# Patient Record
Sex: MALE | Race: BLACK OR AFRICAN AMERICAN | NOT HISPANIC OR LATINO | Employment: OTHER
[De-identification: names, ages, dates, MRNs, and addresses within clinical notes are randomized per-mention and may not be internally consistent; named-entity substitution may affect disease eponyms.]

---

## 2017-10-05 ENCOUNTER — RECORDS - HEALTHEAST (OUTPATIENT)
Dept: ADMINISTRATIVE | Facility: OTHER | Age: 77
End: 2017-10-05

## 2018-05-22 ENCOUNTER — HOME CARE/HOSPICE - HEALTHEAST (OUTPATIENT)
Dept: HOME HEALTH SERVICES | Facility: HOME HEALTH | Age: 78
End: 2018-05-22

## 2018-05-27 ENCOUNTER — COMMUNICATION - HEALTHEAST (OUTPATIENT)
Dept: SCHEDULING | Facility: CLINIC | Age: 78
End: 2018-05-27

## 2018-05-27 DIAGNOSIS — E11.29 TYPE 2 DIABETES MELLITUS WITH OTHER KIDNEY COMPLICATION, UNSPECIFIED LONG TERM INSULIN USE STATUS: ICD-10-CM

## 2018-05-27 RX ORDER — GLUCOSAMINE HCL/CHONDROITIN SU 500-400 MG
1 CAPSULE ORAL PRN
Qty: 10 STRIP | Refills: 0 | Status: SHIPPED | OUTPATIENT
Start: 2018-05-27

## 2018-10-25 ENCOUNTER — RECORDS - HEALTHEAST (OUTPATIENT)
Dept: ADMINISTRATIVE | Facility: OTHER | Age: 78
End: 2018-10-25

## 2021-05-27 ENCOUNTER — RECORDS - HEALTHEAST (OUTPATIENT)
Dept: ADMINISTRATIVE | Facility: CLINIC | Age: 81
End: 2021-05-27

## 2021-05-29 ENCOUNTER — RECORDS - HEALTHEAST (OUTPATIENT)
Dept: ADMINISTRATIVE | Facility: CLINIC | Age: 81
End: 2021-05-29

## 2021-05-30 ENCOUNTER — RECORDS - HEALTHEAST (OUTPATIENT)
Dept: ADMINISTRATIVE | Facility: CLINIC | Age: 81
End: 2021-05-30

## 2021-06-01 ENCOUNTER — RECORDS - HEALTHEAST (OUTPATIENT)
Dept: ADMINISTRATIVE | Facility: CLINIC | Age: 81
End: 2021-06-01

## 2021-06-02 ENCOUNTER — RECORDS - HEALTHEAST (OUTPATIENT)
Dept: ADMINISTRATIVE | Facility: CLINIC | Age: 81
End: 2021-06-02

## 2021-06-16 PROBLEM — R73.9 ACUTE HYPERGLYCEMIA: Status: ACTIVE | Noted: 2018-05-21

## 2021-10-22 DIAGNOSIS — Z11.59 ENCOUNTER FOR SCREENING FOR OTHER VIRAL DISEASES: ICD-10-CM

## 2021-11-12 ENCOUNTER — ANESTHESIA EVENT (OUTPATIENT)
Dept: SURGERY | Facility: AMBULATORY SURGERY CENTER | Age: 81
End: 2021-11-12

## 2021-11-12 RX ORDER — GLIPIZIDE 5 MG/1
5 TABLET ORAL
COMMUNITY
Start: 2021-08-03

## 2021-11-12 ASSESSMENT — MIFFLIN-ST. JEOR: SCORE: 1439.74

## 2021-11-15 ENCOUNTER — ANESTHESIA (OUTPATIENT)
Dept: SURGERY | Facility: AMBULATORY SURGERY CENTER | Age: 81
End: 2021-11-15

## 2021-11-15 ENCOUNTER — HOSPITAL ENCOUNTER (OUTPATIENT)
Facility: AMBULATORY SURGERY CENTER | Age: 81
End: 2021-11-15
Attending: UROLOGY
Payer: COMMERCIAL

## 2021-11-15 VITALS
BODY MASS INDEX: 26.68 KG/M2 | RESPIRATION RATE: 16 BRPM | OXYGEN SATURATION: 100 % | SYSTOLIC BLOOD PRESSURE: 149 MMHG | TEMPERATURE: 97.1 F | WEIGHT: 170 LBS | DIASTOLIC BLOOD PRESSURE: 76 MMHG | HEART RATE: 65 BPM | HEIGHT: 67 IN

## 2021-11-15 DIAGNOSIS — R97.20 ELEVATED PSA: ICD-10-CM

## 2021-11-15 LAB
GLUCOSE BLDC GLUCOMTR-MCNC: 108 MG/DL (ref 70–99)
GLUCOSE POCT: 116 MG/DL (ref 70–99)

## 2021-11-15 RX ORDER — FENTANYL CITRATE 50 UG/ML
25 INJECTION, SOLUTION INTRAMUSCULAR; INTRAVENOUS
Status: DISCONTINUED | OUTPATIENT
Start: 2021-11-15 | End: 2021-11-16 | Stop reason: HOSPADM

## 2021-11-15 RX ORDER — ONDANSETRON 2 MG/ML
INJECTION INTRAMUSCULAR; INTRAVENOUS PRN
Status: DISCONTINUED | OUTPATIENT
Start: 2021-11-15 | End: 2021-11-15

## 2021-11-15 RX ORDER — SODIUM CHLORIDE, SODIUM LACTATE, POTASSIUM CHLORIDE, CALCIUM CHLORIDE 600; 310; 30; 20 MG/100ML; MG/100ML; MG/100ML; MG/100ML
INJECTION, SOLUTION INTRAVENOUS CONTINUOUS
Status: DISCONTINUED | OUTPATIENT
Start: 2021-11-15 | End: 2021-11-16 | Stop reason: HOSPADM

## 2021-11-15 RX ORDER — LEVOFLOXACIN 5 MG/ML
500 INJECTION, SOLUTION INTRAVENOUS
Status: DISCONTINUED | OUTPATIENT
Start: 2021-11-15 | End: 2021-11-15

## 2021-11-15 RX ORDER — OXYCODONE HYDROCHLORIDE 5 MG/1
5 TABLET ORAL EVERY 4 HOURS PRN
Status: DISCONTINUED | OUTPATIENT
Start: 2021-11-15 | End: 2021-11-16 | Stop reason: HOSPADM

## 2021-11-15 RX ORDER — LIDOCAINE HYDROCHLORIDE 10 MG/ML
INJECTION, SOLUTION EPIDURAL; INFILTRATION; INTRACAUDAL; PERINEURAL PRN
Status: DISCONTINUED | OUTPATIENT
Start: 2021-11-15 | End: 2021-11-15 | Stop reason: HOSPADM

## 2021-11-15 RX ORDER — PROPOFOL 10 MG/ML
INJECTION, EMULSION INTRAVENOUS CONTINUOUS PRN
Status: DISCONTINUED | OUTPATIENT
Start: 2021-11-15 | End: 2021-11-15

## 2021-11-15 RX ORDER — DEXAMETHASONE SODIUM PHOSPHATE 4 MG/ML
INJECTION, SOLUTION INTRA-ARTICULAR; INTRALESIONAL; INTRAMUSCULAR; INTRAVENOUS; SOFT TISSUE PRN
Status: DISCONTINUED | OUTPATIENT
Start: 2021-11-15 | End: 2021-11-15

## 2021-11-15 RX ORDER — LIDOCAINE 40 MG/G
CREAM TOPICAL
Status: DISCONTINUED | OUTPATIENT
Start: 2021-11-15 | End: 2021-11-16 | Stop reason: HOSPADM

## 2021-11-15 RX ORDER — PROPOFOL 10 MG/ML
INJECTION, EMULSION INTRAVENOUS PRN
Status: DISCONTINUED | OUTPATIENT
Start: 2021-11-15 | End: 2021-11-15

## 2021-11-15 RX ORDER — ONDANSETRON 4 MG/1
4 TABLET, ORALLY DISINTEGRATING ORAL EVERY 30 MIN PRN
Status: DISCONTINUED | OUTPATIENT
Start: 2021-11-15 | End: 2021-11-16 | Stop reason: HOSPADM

## 2021-11-15 RX ORDER — ONDANSETRON 2 MG/ML
4 INJECTION INTRAMUSCULAR; INTRAVENOUS EVERY 30 MIN PRN
Status: DISCONTINUED | OUTPATIENT
Start: 2021-11-15 | End: 2021-11-16 | Stop reason: HOSPADM

## 2021-11-15 RX ORDER — MEPERIDINE HYDROCHLORIDE 25 MG/ML
12.5 INJECTION INTRAMUSCULAR; INTRAVENOUS; SUBCUTANEOUS
Status: DISCONTINUED | OUTPATIENT
Start: 2021-11-15 | End: 2021-11-16 | Stop reason: HOSPADM

## 2021-11-15 RX ORDER — LIDOCAINE HYDROCHLORIDE 20 MG/ML
INJECTION, SOLUTION INFILTRATION; PERINEURAL PRN
Status: DISCONTINUED | OUTPATIENT
Start: 2021-11-15 | End: 2021-11-15

## 2021-11-15 RX ORDER — FENTANYL CITRATE 50 UG/ML
25 INJECTION, SOLUTION INTRAMUSCULAR; INTRAVENOUS EVERY 5 MIN PRN
Status: DISCONTINUED | OUTPATIENT
Start: 2021-11-15 | End: 2021-11-16 | Stop reason: HOSPADM

## 2021-11-15 RX ORDER — HYDROMORPHONE HCL IN WATER/PF 6 MG/30 ML
0.2 PATIENT CONTROLLED ANALGESIA SYRINGE INTRAVENOUS EVERY 5 MIN PRN
Status: DISCONTINUED | OUTPATIENT
Start: 2021-11-15 | End: 2021-11-16 | Stop reason: HOSPADM

## 2021-11-15 RX ORDER — CIPROFLOXACIN 500 MG/1
500 TABLET, FILM COATED ORAL ONCE
Status: COMPLETED | OUTPATIENT
Start: 2021-11-15 | End: 2021-11-15

## 2021-11-15 RX ORDER — ACETAMINOPHEN 325 MG/1
650 TABLET ORAL
Status: DISCONTINUED | OUTPATIENT
Start: 2021-11-15 | End: 2021-11-16 | Stop reason: HOSPADM

## 2021-11-15 RX ADMIN — LIDOCAINE HYDROCHLORIDE 40 MG: 20 INJECTION, SOLUTION INFILTRATION; PERINEURAL at 09:56

## 2021-11-15 RX ADMIN — PROPOFOL 20 MG: 10 INJECTION, EMULSION INTRAVENOUS at 09:58

## 2021-11-15 RX ADMIN — SODIUM CHLORIDE, SODIUM LACTATE, POTASSIUM CHLORIDE, CALCIUM CHLORIDE: 600; 310; 30; 20 INJECTION, SOLUTION INTRAVENOUS at 09:21

## 2021-11-15 RX ADMIN — ACETAMINOPHEN 650 MG: 325 TABLET ORAL at 10:53

## 2021-11-15 RX ADMIN — CIPROFLOXACIN 500 MG: 500 TABLET, FILM COATED ORAL at 09:16

## 2021-11-15 RX ADMIN — ONDANSETRON 4 MG: 2 INJECTION INTRAMUSCULAR; INTRAVENOUS at 10:05

## 2021-11-15 RX ADMIN — OXYCODONE HYDROCHLORIDE 5 MG: 5 TABLET ORAL at 10:54

## 2021-11-15 RX ADMIN — PROPOFOL 200 MCG/KG/MIN: 10 INJECTION, EMULSION INTRAVENOUS at 09:56

## 2021-11-15 RX ADMIN — DEXAMETHASONE SODIUM PHOSPHATE 4 MG: 4 INJECTION, SOLUTION INTRA-ARTICULAR; INTRALESIONAL; INTRAMUSCULAR; INTRAVENOUS; SOFT TISSUE at 10:05

## 2021-11-15 RX ADMIN — PROPOFOL 30 MG: 10 INJECTION, EMULSION INTRAVENOUS at 09:56

## 2021-11-15 ASSESSMENT — ENCOUNTER SYMPTOMS: SEIZURES: 0

## 2021-11-15 ASSESSMENT — LIFESTYLE VARIABLES: TOBACCO_USE: 1

## 2021-11-15 NOTE — OP NOTE
DOS: 11/15/21  Preoperative diagnosis: elevated psa  Postoperative diagnosis:same  Surgery: transrectal ultrasound guided prostate biopsy  Surgeon: Alfonso Morales   Assistant: none  Specimen: 12 prostate core biopsies.  6 sextant biopsies from right and 6 sextant biopsies from left, individually labeled  Ebl: 5ml  Drains: none  Anesthesia: MAC  Complications: none    Patient was brought into the operating room and identified as Dwain Andrade.   After induction of anesthesia he was prepped and draped in typical sterile manner gillian lateral position.   A time out carried out, everyone was in agreement.   I began by inserting a betadine swab into the rectal cavity and prepping the cavity with betadine.   I then inserted the ultrasound probe. I injected total 9cc of lidocaine into the neurovascular bundles at the insertion of sv into the prostate.     I then measured the prostate for total volume of 30ml    I then began biopsies.   I began on the right side base to apex.   Lateral to medial.   These were appropriately labeled.     I then performed the same thin on the left side with appropriate labeling as well.      This completed our procedure.     Alfonso Morales MD

## 2021-11-15 NOTE — ANESTHESIA CARE TRANSFER NOTE
Patient: Dwain Andrade    Procedure: Procedure(s):  TRANSRECTAL ULTRSOUND GUIDED PROSTATE BIOPSY       Diagnosis: * No pre-op diagnosis entered *  Diagnosis Additional Information: No value filed.    Anesthesia Type:   MAC     Note:    Oropharynx: oropharynx clear of all foreign objects and spontaneously breathing  Level of Consciousness: drowsy  Oxygen Supplementation: face mask  Level of Supplemental Oxygen (L/min / FiO2): 6  Independent Airway: airway patency satisfactory and stable  Dentition: dentition unchanged  Vital Signs Stable: post-procedure vital signs reviewed and stable  Report to RN Given: handoff report given  Patient transferred to: Phase II    Handoff Report: Identifed the Patient, Identified the Reponsible Provider, Reviewed the pertinent medical history, Discussed the surgical course, Reviewed Intra-OP anesthesia mangement and issues during anesthesia, Set expectations for post-procedure period and Allowed opportunity for questions and acknowledgement of understanding      Vitals:  Vitals Value Taken Time   /55    Temp 97.1    Pulse 62    Resp 16    SpO2 99        Electronically Signed By: KENRICK Yoder CRNA  November 15, 2021  10:19 AM

## 2021-11-15 NOTE — ANESTHESIA PREPROCEDURE EVALUATION
Anesthesia Pre-Procedure Evaluation    Patient: Dwain Andrade   MRN: [id number] : 1940        Preoperative Diagnosis: * No pre-op diagnosis entered *    Procedure : Procedure(s):  TRANSRECTAL ULTRSOUND GUIDED PROSTATE BIOPSY          Past Medical History:   Diagnosis Date     Arthritis      Diabetes (H)      GERD (gastroesophageal reflux disease)      High cholesterol      Hyperlipidemia      Kidney infection       Past Surgical History:   Procedure Laterality Date     ABDOMEN SURGERY      Diverticulitis     APPENDECTOMY       KNEE SURGERY        No Known Allergies   Social History     Tobacco Use     Smoking status: Current Some Day Smoker     Packs/day: 0.00     Types: Cigarettes     Smokeless tobacco: Never Used     Tobacco comment: 1 pack per week   Substance Use Topics     Alcohol use: Yes     Comment: Alcoholic Drinks/day: Occasionally      Wt Readings from Last 1 Encounters:   21 77.1 kg (170 lb)        Anesthesia Evaluation   Pt has had prior anesthetic.     No history of anesthetic complications       ROS/MED HX  ENT/Pulmonary:     (+) tobacco use,  (-) sleep apnea   Neurologic:  - neg neurologic ROS  (-) no seizures and no CVA   Cardiovascular:     (+) hypertension----- (-) CAD   METS/Exercise Tolerance:     Hematologic:  - neg hematologic  ROS     Musculoskeletal:  - neg musculoskeletal ROS     GI/Hepatic:     (+) GERD, Asymptomatic on medication,     Renal/Genitourinary:     (+) renal disease, type: CRI,     Endo:     (+) type II DM, Not using insulin,     Psychiatric/Substance Use:  - neg psychiatric ROS     Infectious Disease:  - neg infectious disease ROS  (-) Recent Fever   Malignancy:       Other:            Physical Exam    Airway  airway exam normal      Mallampati: II   TM distance: > 3 FB   Neck ROM: full   Mouth opening: > 3 cm    Respiratory Devices and Support         Dental  no notable dental history         Cardiovascular   cardiovascular exam normal       Rhythm and rate:  regular and normal     Pulmonary   pulmonary exam normal        breath sounds clear to auscultation           OUTSIDE LABS:  CBC:   Lab Results   Component Value Date    WBC 6.6 02/16/2021    WBC 5.3 10/02/2019    HGB 16.3 02/16/2021    HGB 14.9 10/02/2019    HCT 49.7 02/16/2021    HCT 45.1 10/02/2019     02/16/2021     (L) 10/02/2019     BMP:   Lab Results   Component Value Date     02/16/2021     10/02/2019    POTASSIUM 4.8 02/16/2021    POTASSIUM 4.2 10/02/2019    CHLORIDE 107 02/16/2021    CHLORIDE 112 (H) 10/02/2019    CO2 21 (L) 02/16/2021    CO2 24 10/02/2019    BUN 13 02/16/2021    BUN 12 10/02/2019    CR 1.06 02/16/2021    CR 0.89 10/02/2019     (H) 02/16/2021    GLC 98 10/02/2019     COAGS:   Lab Results   Component Value Date    INR 1.17 (H) 01/19/2019     POC: No results found for: BGM, HCG, HCGS  HEPATIC:   Lab Results   Component Value Date    ALBUMIN 3.4 (L) 08/26/2019    PROTTOTAL 6.7 08/26/2019    ALT 13 08/26/2019    AST 14 08/26/2019    ALKPHOS 65 08/26/2019    BILITOTAL 0.4 08/26/2019     OTHER:   Lab Results   Component Value Date    LACT 2.1 05/22/2018    A1C 11.8 (H) 05/22/2018    NISHI 9.5 02/16/2021    MAG 2.6 05/21/2018    LIPASE 36 05/21/2018       Anesthesia Plan    ASA Status:  3   NPO Status:  NPO Appropriate    Anesthesia Type: MAC.              Consents    Anesthesia Plan(s) and associated risks, benefits, and realistic alternatives discussed. Questions answered and patient/representative(s) expressed understanding.     - Discussed with:  Patient         Postoperative Care            Comments:                Yovani Hobson MD

## 2021-11-15 NOTE — ANESTHESIA POSTPROCEDURE EVALUATION
Patient: Dwain Andrade    Procedure: Procedure(s):  TRANSRECTAL ULTRSOUND GUIDED PROSTATE BIOPSY       Diagnosis:* No pre-op diagnosis entered *  Diagnosis Additional Information: No value filed.    Anesthesia Type:  MAC    Note:  Disposition: Outpatient   Postop Pain Control: Uneventful            Sign Out: Well controlled pain   PONV: No   Neuro/Psych: Uneventful            Sign Out: Acceptable/Baseline neuro status   Airway/Respiratory: Uneventful            Sign Out: Acceptable/Baseline resp. status   CV/Hemodynamics: Uneventful            Sign Out: Acceptable CV status; No obvious hypovolemia; No obvious fluid overload   Other NRE: NONE   DID A NON-ROUTINE EVENT OCCUR? No           Last vitals:  Vitals Value Taken Time   /76 11/15/21 1115   Temp 97.1  F (36.2  C) 11/15/21 1016   Pulse 65 11/15/21 1115   Resp 16 11/15/21 1115   SpO2 100 % 11/15/21 1115       Electronically Signed By: Yovani Hobson MD  November 15, 2021  11:18 AM

## 2021-11-15 NOTE — DISCHARGE INSTRUCTIONS
You received Oxycodone 5 mg at 1053 am.     You may use ice packs to the area for pain and swelling.    Call Dr. Morales with any questions or concerns, [phone].      Discharge Instructions: After Your Surgery  You ve just had surgery. During surgery, you were given medicine called anesthesia to keep you relaxed and free of pain. After surgery, you may have some pain or nausea. This is common. Here are some tips for feeling better and getting well after surgery.  Going home  Your healthcare provider will show you how to take care of yourself when you go home. He or she will also answer your questions. Have an adult family member or friend drive you home. For the first 24 hours after your surgery:    Don't drive or use heavy equipment.    Don't make important decisions or sign legal papers.    Don't drink alcohol.    Have someone stay with you. He or she can watch for problems and help keep you safe.  Be sure to go to all follow-up visits with your healthcare provider. And rest after your surgery for as long as your healthcare provider tells you to.  Coping with pain  If you have pain after surgery, pain medicine will help you feel better. Take it as told, before pain becomes severe. Also, ask your healthcare provider or pharmacist about other ways to control pain. This might be with heat, ice, or relaxation. And follow any other instructions your surgeon or nurse gives you.  Tips for taking pain medicine  To get the best relief possible, remember these points:    Pain medicines can upset your stomach. Taking them with a little food may help.    Most pain relievers taken by mouth need at least 20 to 30 minutes to start to work.    Don't wait till your pain becomes severe before you take your medicine. Try to time your medicine so that you can take it before starting an activity. This might be before you get dressed, go for a walk, or sit down for dinner.    Constipation is a common side effect of pain  medicines. You may take laxatives or stool softeners to help ease constipation.  Drinking lots of fluids and eating foods such as fruits and vegetables that are high in fiber can also help.     Drinking alcohol and taking pain medicine can cause dizziness and slow your breathing. It can even be deadly. Don't drink alcohol while taking pain medicine.    Pain medicine can make you react more slowly to things. Don't drive or run machinery while taking pain medicine.  Your healthcare provider may tell you to take acetaminophen to help ease your pain. Ask him or her how much you are supposed to take each day. Acetaminophen or other pain relievers may interact with your prescription medicines or other over-the-counter (OTC) medicines. Some prescription medicines have acetaminophen and other ingredients. Using both prescription and OTC acetaminophen for pain can cause you to overdose. Read the labels on your OTC medicines with care. This will help you to clearly know the list of ingredients, how much to take, and any warnings. It may also help you not take too much acetaminophen. If you have questions or don't understand the information, ask your pharmacist or healthcare provider to explain it to you before you take the OTC medicine.  Managing nausea  Some people have an upset stomach after surgery. This is often because of anesthesia, pain, or pain medicine, or the stress of surgery. These tips will help you handle nausea and eat healthy foods as you get better. If you were on a special food plan before surgery, ask your healthcare provider if you should follow it while you get better. These tips may help:    Don't push yourself to eat. Your body will tell you when to eat and how much.    Start off with clear liquids and soup. They are easier to digest.    Next try semi-solid foods, such as mashed potatoes, applesauce, and gelatin, as you feel ready.    Slowly move to solid foods. Don t eat fatty, rich, or spicy foods at  first.    Don't force yourself to have 3 large meals a day. Instead eat smaller amounts more often.    Take pain medicines with a small amount of solid food, such as crackers or toast, to prevent nausea.  When to call your healthcare provider  Call your healthcare provider if:    You still have intolerable pain an hour after taking medicine. The medicine may not be strong enough.    You feel too sleepy, dizzy, or groggy. The medicine may be too strong.    You have side effects such as nausea or vomiting, or skin changes such as rash, itching, or hives. Your healthcare provider may suggest other medicines to control side effects.  Rash, itching, or hives may mean you have an allergic reaction. Report this right away. If you have trouble breathing or facial swelling, call 911 right away.  If you have obstructive sleep apnea  You were given anesthesia medicine during surgery to keep you comfortable and free of pain. After surgery, you may have more apnea spells because of this medicine and other medicines you were given. The spells may last longer than usual.   At home:    Keep using the continuous positive airway pressure (CPAP) device when you sleep. Unless your healthcare provider tells you not to, use it when you sleep, day or night. CPAP is a common device used to treat obstructive sleep apnea.    Talk with your provider before taking any pain medicine, muscle relaxants, or sedatives. Your provider will tell you about the possible dangers of taking these medicines.  [employer] last reviewed this educational content on 3/1/2019    7523-7897 [employer]. All rights reserved. This information is not intended as a substitute for professional medical care. Always follow your healthcare professional's instructions.

## 2022-05-01 ENCOUNTER — NURSE TRIAGE (OUTPATIENT)
Dept: NURSING | Facility: CLINIC | Age: 82
End: 2022-05-01
Payer: COMMERCIAL

## 2022-05-02 ENCOUNTER — HOSPITAL ENCOUNTER (EMERGENCY)
Facility: HOSPITAL | Age: 82
Discharge: HOME | End: 2022-05-02
Admitting: PHYSICIAN ASSISTANT
Payer: COMMERCIAL

## 2022-05-02 ENCOUNTER — APPOINTMENT (OUTPATIENT)
Dept: CT IMAGING | Facility: HOSPITAL | Age: 82
End: 2022-05-02
Attending: EMERGENCY MEDICINE
Payer: COMMERCIAL

## 2022-05-02 ENCOUNTER — APPOINTMENT (OUTPATIENT)
Dept: ULTRASOUND IMAGING | Facility: HOSPITAL | Age: 82
End: 2022-05-02
Payer: COMMERCIAL

## 2022-05-02 VITALS
RESPIRATION RATE: 16 BRPM | SYSTOLIC BLOOD PRESSURE: 142 MMHG | HEART RATE: 69 BPM | HEIGHT: 67 IN | DIASTOLIC BLOOD PRESSURE: 79 MMHG | BODY MASS INDEX: 27.47 KG/M2 | OXYGEN SATURATION: 97 % | WEIGHT: 175 LBS | TEMPERATURE: 97.1 F

## 2022-05-02 DIAGNOSIS — N45.1 EPIDIDYMITIS, BILATERAL: ICD-10-CM

## 2022-05-02 DIAGNOSIS — N39.0 URINARY TRACT INFECTION: ICD-10-CM

## 2022-05-02 DIAGNOSIS — R10.32 ABDOMINAL PAIN, LEFT LOWER QUADRANT: ICD-10-CM

## 2022-05-02 LAB
ALBUMIN UR-MCNC: NEGATIVE MG/DL
ANION GAP SERPL CALCULATED.3IONS-SCNC: 10 MMOL/L (ref 5–18)
APPEARANCE UR: CLEAR
BACTERIA #/AREA URNS HPF: ABNORMAL /HPF
BILIRUB UR QL STRIP: NEGATIVE
BUN SERPL-MCNC: 10 MG/DL (ref 8–28)
CALCIUM SERPL-MCNC: 9.1 MG/DL (ref 8.5–10.5)
CHLORIDE BLD-SCNC: 107 MMOL/L (ref 98–107)
CO2 SERPL-SCNC: 24 MMOL/L (ref 22–31)
COLOR UR AUTO: ABNORMAL
CREAT SERPL-MCNC: 0.83 MG/DL (ref 0.7–1.3)
ERYTHROCYTE [DISTWIDTH] IN BLOOD BY AUTOMATED COUNT: 13.3 % (ref 10–15)
GFR SERPL CREATININE-BSD FRML MDRD: 88 ML/MIN/1.73M2
GLUCOSE BLD-MCNC: 214 MG/DL (ref 70–125)
GLUCOSE UR STRIP-MCNC: 150 MG/DL
HCT VFR BLD AUTO: 45.1 % (ref 40–53)
HGB BLD-MCNC: 14.6 G/DL (ref 13.3–17.7)
HGB UR QL STRIP: NEGATIVE
HOLD SPECIMEN: NORMAL
KETONES UR STRIP-MCNC: NEGATIVE MG/DL
LEUKOCYTE ESTERASE UR QL STRIP: ABNORMAL
MCH RBC QN AUTO: 31 PG (ref 26.5–33)
MCHC RBC AUTO-ENTMCNC: 32.4 G/DL (ref 31.5–36.5)
MCV RBC AUTO: 96 FL (ref 78–100)
MUCOUS THREADS #/AREA URNS LPF: PRESENT /LPF
NITRATE UR QL: NEGATIVE
PH UR STRIP: 6.5 [PH] (ref 5–7)
PLATELET # BLD AUTO: 138 10E3/UL (ref 150–450)
POTASSIUM BLD-SCNC: 4.3 MMOL/L (ref 3.5–5)
RBC # BLD AUTO: 4.71 10E6/UL (ref 4.4–5.9)
RBC URINE: 1 /HPF
SODIUM SERPL-SCNC: 141 MMOL/L (ref 136–145)
SP GR UR STRIP: 1.02 (ref 1–1.03)
SQUAMOUS EPITHELIAL: 2 /HPF
UROBILINOGEN UR STRIP-MCNC: <2 MG/DL
WBC # BLD AUTO: 5.8 10E3/UL (ref 4–11)
WBC CLUMPS #/AREA URNS HPF: PRESENT /HPF
WBC URINE: 12 /HPF

## 2022-05-02 PROCEDURE — 87086 URINE CULTURE/COLONY COUNT: CPT | Performed by: EMERGENCY MEDICINE

## 2022-05-02 PROCEDURE — 85027 COMPLETE CBC AUTOMATED: CPT | Performed by: EMERGENCY MEDICINE

## 2022-05-02 PROCEDURE — 74177 CT ABD & PELVIS W/CONTRAST: CPT

## 2022-05-02 PROCEDURE — 81001 URINALYSIS AUTO W/SCOPE: CPT | Performed by: EMERGENCY MEDICINE

## 2022-05-02 PROCEDURE — 99285 EMERGENCY DEPT VISIT HI MDM: CPT | Mod: 25

## 2022-05-02 PROCEDURE — 250N000011 HC RX IP 250 OP 636: Performed by: EMERGENCY MEDICINE

## 2022-05-02 PROCEDURE — 96374 THER/PROPH/DIAG INJ IV PUSH: CPT

## 2022-05-02 PROCEDURE — 36415 COLL VENOUS BLD VENIPUNCTURE: CPT | Performed by: EMERGENCY MEDICINE

## 2022-05-02 PROCEDURE — 76870 US EXAM SCROTUM: CPT

## 2022-05-02 PROCEDURE — 250N000013 HC RX MED GY IP 250 OP 250 PS 637: Performed by: PHYSICIAN ASSISTANT

## 2022-05-02 PROCEDURE — 250N000011 HC RX IP 250 OP 636: Performed by: PHYSICIAN ASSISTANT

## 2022-05-02 PROCEDURE — 80048 BASIC METABOLIC PNL TOTAL CA: CPT | Performed by: EMERGENCY MEDICINE

## 2022-05-02 RX ORDER — KETOROLAC TROMETHAMINE 30 MG/ML
15 INJECTION, SOLUTION INTRAMUSCULAR; INTRAVENOUS ONCE
Status: COMPLETED | OUTPATIENT
Start: 2022-05-02 | End: 2022-05-02

## 2022-05-02 RX ORDER — LEVOFLOXACIN 500 MG/1
500 TABLET, FILM COATED ORAL ONCE
Status: COMPLETED | OUTPATIENT
Start: 2022-05-02 | End: 2022-05-02

## 2022-05-02 RX ORDER — IOPAMIDOL 755 MG/ML
100 INJECTION, SOLUTION INTRAVASCULAR ONCE
Status: COMPLETED | OUTPATIENT
Start: 2022-05-02 | End: 2022-05-02

## 2022-05-02 RX ORDER — SULFAMETHOXAZOLE/TRIMETHOPRIM 800-160 MG
1 TABLET ORAL 2 TIMES DAILY
Qty: 20 TABLET | Refills: 0 | Status: SHIPPED | OUTPATIENT
Start: 2022-05-02 | End: 2022-05-12

## 2022-05-02 RX ORDER — LEVOFLOXACIN 500 MG/1
500 TABLET, FILM COATED ORAL DAILY
Qty: 9 TABLET | Refills: 0 | Status: SHIPPED | OUTPATIENT
Start: 2022-05-02 | End: 2022-05-02 | Stop reason: ALTCHOICE

## 2022-05-02 RX ADMIN — LEVOFLOXACIN 500 MG: 500 TABLET, FILM COATED ORAL at 14:47

## 2022-05-02 RX ADMIN — IOPAMIDOL 100 ML: 755 INJECTION, SOLUTION INTRAVENOUS at 13:49

## 2022-05-02 RX ADMIN — KETOROLAC TROMETHAMINE 15 MG: 30 INJECTION, SOLUTION INTRAMUSCULAR at 14:46

## 2022-05-02 ASSESSMENT — ENCOUNTER SYMPTOMS
DYSURIA: 0
HEMATURIA: 0
BACK PAIN: 1
VOMITING: 0
NAUSEA: 0
BLOOD IN STOOL: 0
DIARRHEA: 0
FEVER: 0
ABDOMINAL PAIN: 1
CONSTIPATION: 0

## 2022-05-02 NOTE — TELEPHONE ENCOUNTER
Dwain reports that for the past 2-3 hours, (since 7:30 pm to 8 pm), he's been having intermittent, sharp pain to his lower left abdomen/inguinal area  - Lasts a few seconds  - Rated 8-9/10  - Last bowel movement earlier today - Denies problem with Constipation  - Few days ago, noted slow urination stream - improved with increased fluid intake  - No palpable bulging in the area  - Had an Ultrasound about a month ago, post Prostate Biopsy, and was told that he did not have any kidney stones at that time  - History of DM2    He was seen in ER on 2/16/21 for Lt Inguinal pain; Lt lower abdominal pain.  This was thought to possibly be due to recent heavy lifting    Has been exercising lately - lifting hand weights and doing push ups    He states that the pain he is having today is NOT similar to the pain he remembers from that visit    Tried  - Tylenol - no change in pain    ER advised  Care Advice reviewed    COVID 19 Nurse Triage Plan/Patient Instructions    Please be aware that novel coronavirus (COVID-19) may be circulating in the community. If you develop symptoms such as fever, cough, or SOB or if you have concerns about the presence of another infection including coronavirus (COVID-19), please contact your health care provider or visit [email or web address].     Disposition/Instructions    ED Visit recommended. Follow protocol based instructions.     Bring Your Own Device:  Please also bring your smart device(s) (smart phones, tablets, laptops) and their charging cables for your personal use and to communicate with your care team during your visit.    Thank you for taking steps to prevent the spread of this virus.  o Limit your contact with others.  o Wear a simple mask to cover your cough.  o Wash your hands well and often.    Resources    [facility]: About COVID-19: [email or web address]    CDC: What to Do If You're Sick: www.cdc.gov/coronavirus/2019-ncov/about/steps-when-sick.html    CDC:  Ending Home Isolation: www.cdc.gov/coronavirus/2019-ncov/hcp/disposition-in-home-patients.html     CDC: Caring for Someone: www.cdc.gov/coronavirus/2019-ncov/if-you-are-sick/care-for-someone.html     [facility]: Interim Guidance for Hospital Discharge to Home: www.health.[facility].[place]/diseases/coronavirus/hcp/hospdischarge.pdf    [facility] clinical trials (COVID-19 research studies): [email or web address]     Below are the COVID-19 hotlines at the [place] [facility] ([facility]). Interpreters are available.   o For health questions: Call 212-643-4930 or 1-679.472.6112 (7 a.m. to 7 p.m.)  o For questions about schools and childcare: Call 680-832-8819 or 1-599.301.6621 (7 a.m. to 7 p.m.)     Elizabeth Mijares RN  [facility]      Reason for Disposition    [1] SEVERE pain AND [2] age > 60    Additional Information    Negative: Shock suspected (e.g., cold/pale/clammy skin, too weak to stand, low BP, rapid pulse)    Negative: Difficult to awaken or acting confused (e.g., disoriented, slurred speech)    Negative: Passed out (i.e., lost consciousness, collapsed and was not responding)    Negative: Sounds like a life-threatening emergency to the triager    Negative: [1] SEVERE pain (e.g., excruciating) AND [2] present > 1 hour    Protocols used: ABDOMINAL PAIN - MALE-A-AH

## 2022-05-02 NOTE — ED PROVIDER NOTES
EMERGENCY DEPARTMENT ENCOUNTER      NAME: Dwain Andrade  AGE: 81 year old male  YOB: 1940  MRN: [id number]  EVALUATION DATE & TIME: 5/2/2022  1:52 PM    PCP: Patt Sheikh    ED PROVIDER: Carlos A Cerrato PA-C      Chief Complaint   Patient presents with     Abdominal Pain         FINAL IMPRESSION:  1. Abdominal pain, left lower quadrant    2. Urinary tract infection          MEDICAL DECISION MAKING:    Pertinent Labs & Imaging studies reviewed. (See chart for details)  81 year old male presents to the Emergency Department for evaluation of left lower quadrant abdominal pain.    Patient was in initially assessed in triage, had urinalysis, labs, CT scan ordered.  During my interview and examination he did have reproducible tenderness in the left lower quadrant.  He does state that he has some pain referred down into his testicles and a little bit of discomfort.    CT scan did return without any acute findings that would be consistent with cause of pain therefore I did expand the work-up to include testicular ultrasound.  His urinalysis does show bacteria and white blood cells, will initiate antibiotic therapy in the form of Levaquin.  Patient does not appear toxic, minimal distress, plan to treat with a dose of Toradol.    Currently patient's scrotal ultrasound results are pending.  This has been signed out to Airam Basilio PA-C.  She will follow-up on these results and augment disposition planning based on these.  If this is unremarkable I have the patient written up with antibiotic therapy to treat his UA findings and also recommended follow-up through the primary care.        ED COURSE  2:04 PM   I met with the patient, obtained history, performed an initial exam, and discussed options and plan for diagnostics and treatment here in the ED.    At the conclusion of the encounter I discussed the results of all of the tests and the disposition. The questions were answered. The patient or family  acknowledged understanding and was agreeable with the care plan.     MEDICATIONS GIVEN IN THE EMERGENCY:  Medications   iopamidol (ISOVUE-370) solution 100 mL (100 mLs Intravenous Given 5/2/22 1349)   ketorolac (TORADOL) injection 15 mg (15 mg Intravenous Given 5/2/22 1446)   levofloxacin (LEVAQUIN) tablet 500 mg (500 mg Oral Given 5/2/22 1447)       NEW PRESCRIPTIONS STARTED AT TODAY'S ER VISIT  New Prescriptions    LEVOFLOXACIN (LEVAQUIN) 500 MG TABLET    Take 1 tablet (500 mg) by mouth daily for 9 days            =================================================================    HPI    Patient information was obtained from: patient     Use of Interpretor: N/A         Dwain Andrade is a 81 year old male with a pertinent history of T2DM, kidney infection who presents to this ED by walk in for evaluation of abdominal pain.     Patient reports LLQ abdominal pain with onset yesterday at 2000. He describes the pain as sharp and intermittent. He has some radiation around to his back. Patient also reports radiation of pain down his leg that wraps around to the front. Patient denies urinary urgency, dysuria, hematuria, constipation, blood in stool, diarrhea, nausea, vomiting, fever, or any other complaints at this time.     REVIEW OF SYSTEMS   Review of Systems   Constitutional: Negative for fever.   Gastrointestinal: Positive for abdominal pain (LLQ). Negative for blood in stool, constipation, diarrhea, nausea and vomiting.   Genitourinary: Negative for dysuria, hematuria and urgency.   Musculoskeletal: Positive for back pain (lower).   All other systems reviewed and are negative.     PAST MEDICAL HISTORY:  Past Medical History:   Diagnosis Date     Arthritis      Diabetes (H)      GERD (gastroesophageal reflux disease)      High cholesterol      Hyperlipidemia      Kidney infection        PAST SURGICAL HISTORY:  Past Surgical History:   Procedure Laterality Date     ABDOMEN SURGERY      Diverticulitis     APPENDECTOMY        KNEE SURGERY       NEEDLE BIOPSY, PROSTATE N/A 11/15/2021    Procedure: TRANSRECTAL ULTRSOUND GUIDED PROSTATE BIOPSY;  Surgeon: Alfonso Morales MD;  Location: [facility]         CURRENT MEDICATIONS:    No current facility-administered medications for this encounter.    Current Outpatient Medications:      levofloxacin (LEVAQUIN) 500 MG tablet, Take 1 tablet (500 mg) by mouth daily for 9 days, Disp: 9 tablet, Rfl: 0     acetaminophen (TYLENOL) 325 MG tablet, [ACETAMINOPHEN (TYLENOL) 325 MG TABLET] Take 2 tablets (650 mg total) by mouth every 6 (six) hours as needed for pain., Disp: 30 tablet, Rfl: 0     albuterol (PROAIR HFA;PROVENTIL HFA;VENTOLIN HFA) 90 mcg/actuation inhaler, [ALBUTEROL (PROAIR HFA;PROVENTIL HFA;VENTOLIN HFA) 90 MCG/ACTUATION INHALER] Inhale 2 puffs every 4 (four) hours as needed for wheezing., Disp: 1 Inhaler, Rfl: 0     aspirin 81 mg chewable tablet, [ASPIRIN 81 MG CHEWABLE TABLET] Chew 81 mg daily., Disp: , Rfl:      blood glucose test strips, [BLOOD GLUCOSE TEST STRIPS] Use 1 each As Directed as needed. Dispense brand per patient's insurance at pharmacy discretion.  ICD Code: E 11.9, Disp: 10 strip, Rfl: 0     CALCIUM CARBONATE/MAG HYDROX (ROLAIDS ORAL), [CALCIUM CARBONATE/MAG HYDROX (ROLAIDS ORAL)] Take 1 tablet by mouth every 4 (four) hours as needed., Disp: , Rfl:      glipiZIDE (GLUCOTROL) 5 MG tablet, Take by mouth daily , Disp: , Rfl:      HYDROcodone-acetaminophen 5-325 mg per tablet, [HYDROCODONE-ACETAMINOPHEN 5-325 MG PER TABLET] Take 1 tablet by mouth every 4 (four) hours as needed for pain., Disp: 10 tablet, Rfl: 0     ibuprofen (ADVIL,MOTRIN) 200 MG tablet, [IBUPROFEN (ADVIL,MOTRIN) 200 MG TABLET] Take 400 mg by mouth every 6 (six) hours as needed for pain.       , Disp: , Rfl:      metFORMIN (GLUCOPHAGE) 1000 MG tablet, [METFORMIN (GLUCOPHAGE) 1000 MG TABLET] Take 1 tablet (1,000 mg total) by mouth 2 (two) times a day with meals., Disp: 60 tablet, Rfl: 0      "naproxen (NAPROSYN) 500 MG tablet, [NAPROXEN (NAPROSYN) 500 MG TABLET] Take 1 tablet (500 mg total) by mouth 2 (two) times a day with meals., Disp: 30 tablet, Rfl: 0     omeprazole (PRILOSEC) 40 MG capsule, [OMEPRAZOLE (PRILOSEC) 40 MG CAPSULE] Take 40 mg by mouth daily., Disp: , Rfl:      pseudoephedrine (SUDAFED) 60 MG tablet, [PSEUDOEPHEDRINE (SUDAFED) 60 MG TABLET] Take 1 tablet (60 mg total) by mouth every 4 (four) hours as needed for congestion., Disp: 24 tablet, Rfl: 0     simvastatin (ZOCOR) 10 MG tablet, [SIMVASTATIN (ZOCOR) 10 MG TABLET] Take 10 mg by mouth bedtime., Disp: , Rfl:       ALLERGIES:  No Known Allergies    FAMILY HISTORY:  History reviewed. No pertinent family history.    SOCIAL HISTORY:   Social History     Socioeconomic History     Marital status: Single   Tobacco Use     Smoking status: Current Some Day Smoker     Packs/day: 0.00     Types: Cigarettes     Smokeless tobacco: Never Used     Tobacco comment: 1 pack per week   Substance and Sexual Activity     Alcohol use: Yes     Comment: Alcoholic Drinks/day: Occasionally     Drug use: Not Currently   Social History Narrative    5/21/2018: Currently lives alone and retired from [employer] in [place].       VITALS:  Patient Vitals for the past 24 hrs:   BP Temp Temp src Pulse Resp SpO2 Height Weight   05/02/22 1410 (!) 142/79 -- -- 69 16 97 % -- --   05/02/22 1153 (!) 152/77 97.1  F (36.2  C) Temporal 80 16 97 % 1.702 m (5' 7\") 79.4 kg (175 lb)       PHYSICAL EXAM    Physical Exam  Vitals and nursing note reviewed.   Constitutional:       General: He is not in acute distress.     Appearance: He is normal weight.   HENT:      Head: Normocephalic.      Right Ear: External ear normal.      Left Ear: External ear normal.   Eyes:      Conjunctiva/sclera: Conjunctivae normal.   Cardiovascular:      Rate and Rhythm: Normal rate.      Pulses: Normal pulses.   Pulmonary:      Effort: Pulmonary effort is normal. No respiratory distress. "   Abdominal:      General: Abdomen is flat.      Tenderness: There is abdominal tenderness. There is no guarding or rebound.      Comments: Tenderness to the left lower quadrant.  Testicular exam cannot be performed based on patient being in a urgent care type room and not a private room.   Musculoskeletal:         General: No tenderness or deformity. Normal range of motion.   Skin:     General: Skin is warm.      Findings: No bruising or erythema.   Neurological:      General: No focal deficit present.      Mental Status: He is alert. Mental status is at baseline.      Sensory: No sensory deficit.      Motor: No weakness.   Psychiatric:         Mood and Affect: Mood normal.          LAB:  All pertinent labs reviewed and interpreted.  Results for orders placed or performed during the hospital encounter of 05/02/22   CT Abdomen Pelvis w Contrast    Impression    IMPRESSION:     1.  Colonic diverticulosis without evidence for diverticulitis.    2.  No bowel obstruction. No free fluid or air.    3.  Small fat-containing periumbilical hernia. No inguinal hernia.       UA with Microscopic reflex to Culture    Specimen: Urine, Clean Catch   Result Value Ref Range    Color Urine Light Yellow Colorless, Straw, Light Yellow, Yellow    Appearance Urine Clear Clear    Glucose Urine 150  (A) Negative mg/dL    Bilirubin Urine Negative Negative    Ketones Urine Negative Negative mg/dL    Specific Gravity Urine 1.021 1.001 - 1.030    Blood Urine Negative Negative    pH Urine 6.5 5.0 - 7.0    Protein Albumin Urine Negative Negative mg/dL    Urobilinogen Urine <2.0 <2.0 mg/dL    Nitrite Urine Negative Negative    Leukocyte Esterase Urine 75 Umer/uL (A) Negative    Bacteria Urine Few (A) None Seen /HPF    WBC Clumps Urine Present (A) None Seen /HPF    Mucus Urine Present (A) None Seen /LPF    RBC Urine 1 <=2 /HPF    WBC Urine 12 (H) <=5 /HPF    Squamous Epithelials Urine 2 (H) <=1 /HPF   CBC (+ platelets, no diff)   Result Value Ref  Range    WBC Count 5.8 4.0 - 11.0 10e3/uL    RBC Count 4.71 4.40 - 5.90 10e6/uL    Hemoglobin 14.6 13.3 - 17.7 g/dL    Hematocrit 45.1 40.0 - 53.0 %    MCV 96 78 - 100 fL    MCH 31.0 26.5 - 33.0 pg    MCHC 32.4 31.5 - 36.5 g/dL    RDW 13.3 10.0 - 15.0 %    Platelet Count 138 (L) 150 - 450 10e3/uL   Basic metabolic panel   Result Value Ref Range    Sodium 141 136 - 145 mmol/L    Potassium 4.3 3.5 - 5.0 mmol/L    Chloride 107 98 - 107 mmol/L    Carbon Dioxide (CO2) 24 22 - 31 mmol/L    Anion Gap 10 5 - 18 mmol/L    Urea Nitrogen 10 8 - 28 mg/dL    Creatinine 0.83 0.70 - 1.30 mg/dL    Calcium 9.1 8.5 - 10.5 mg/dL    Glucose 214 (H) 70 - 125 mg/dL    GFR Estimate 88 >60 mL/min/1.73m2   Extra Red Top Tube   Result Value Ref Range    Hold Specimen JIC    Extra Green Top (Lithium Heparin) Tube   Result Value Ref Range    Hold Specimen JIC    Extra Purple Top Tube   Result Value Ref Range    Hold Specimen JIC        RADIOLOGY:  Reviewed all pertinent imaging. Please see official radiology report.  CT Abdomen Pelvis w Contrast   Final Result   IMPRESSION:       1.  Colonic diverticulosis without evidence for diverticulitis.      2.  No bowel obstruction. No free fluid or air.      3.  Small fat-containing periumbilical hernia. No inguinal hernia.            US Testicular & Scrotum w Doppler Ltd    (Results Pending)         I, Gurpreet Carnes, am serving as a scribe to document services personally performed by Carlos A Cerrato PA-C based on my observation and the provider's statements to me. I, Carlos A Cerrato PA-C attest that Gurpreet Carnes is acting in a scribe capacity, has observed my performance of the services and has documented them in accordance with my direction.    Carlos A Cerrato PA-C  Emergency Medicine  [facility]     Carlos A Cerrato PA-C  05/02/22 9451

## 2022-05-02 NOTE — ED PROVIDER NOTES
"    ED Provider In Triage Note  [facility]  Encounter Date: May 2, 2022    Chief Complaint   Patient presents with     Abdominal Pain         Use of Intrepreter: N/A     Brief HPI:   Dwain Andrade is a 81 year old male presenting to the Emergency Department with a chief complaint of left groin pain that radiates to left testicle and left leg. Began last night. Denies h/o hernia or lumps in groin. Denies urinary sx.      Brief Physical Exam:  BP (!) 152/77 (BP Location: Left arm)   Pulse 80   Temp 97.1  F (36.2  C) (Temporal)   Resp 16   Ht 1.702 m (5' 7\")   Wt 79.4 kg (175 lb)   SpO2 97%   BMI 27.41 kg/m    General: Non-toxic appearing  HEENT: Atraumatic  Resp: No respiratory distress  Abdomen: +LLQ and inguinal tenderness, rest of abd is nontender  : deferred during triage eval  Neuro: Alert, oriented, answers questions appropriately  Psych: Behavior appropriate  Musculoskeletal: atraumatic      Plan Initiated in Triage:  Labs and CT      PIT Dispo:   Return to lobby while awaiting workup and ED bed availability          Julianna Bronson MD on 5/2/2022 at 11:54 AM        Patient was evaluated by the Physician in Triage due to a limitation of available rooms in the Emergency Department. A plan of care was discussed based on the information obtained on the initial evaluation and patient was counseled to return back to the Emergency Department lobby after this initial evalutaiton until results were obtained or a room became available in the Emergency Department. Patient was counseled not to leave prior to receiving the results of their workup.            Julianna Bronson MD  05/02/22 1157       Julianna Bronson MD  05/02/22 1158       Julianna Bronson MD  05/02/22 1204    "

## 2022-05-02 NOTE — ED PROVIDER NOTES
EMERGENCY DEPARTMENT SIGN OUT NOTE        ED COURSE AND MEDICAL DECISION MAKING  4:07 PM Patient was signed out to me by Carlos A Cerrato PA-C   5:24 PM I rechecked and updated the patient.      In brief, Dwain Andrade is a 81 year old male who initially presented with intermittent left lower quadrant abdominal and testicle pain that started yesterday.     At time of sign out, disposition was pending testicular & scrotum ultrasound results.     Ultrasound shows bilateral epididymal enlargement, given concurrent positive UA will treat for epididymitis with bactrim (contraindication to levofloxacin with glipizide). Pt in agreement with this plan and strict return precautions discussed. He was worried abotu recurrent UTI as he ages and we discussed hygiene d/t not being circumcised and drinking adequate fluids. He will follow up with primary care if other concerns.     FINAL IMPRESSION    1. Abdominal pain, left lower quadrant    2. Urinary tract infection    3. Epididymitis, bilateral        ED MEDS  Medications   iopamidol (ISOVUE-370) solution 100 mL (100 mLs Intravenous Given 5/2/22 1349)   ketorolac (TORADOL) injection 15 mg (15 mg Intravenous Given 5/2/22 1446)   levofloxacin (LEVAQUIN) tablet 500 mg (500 mg Oral Given 5/2/22 1447)       LAB  Labs Ordered and Resulted from Time of ED Arrival to Time of ED Departure   ROUTINE UA WITH MICROSCOPIC REFLEX TO CULTURE - Abnormal       Result Value    Color Urine Light Yellow      Appearance Urine Clear      Glucose Urine 150  (*)     Bilirubin Urine Negative      Ketones Urine Negative      Specific Gravity Urine 1.021      Blood Urine Negative      pH Urine 6.5      Protein Albumin Urine Negative      Urobilinogen Urine <2.0      Nitrite Urine Negative      Leukocyte Esterase Urine 75 Umer/uL (*)     Bacteria Urine Few (*)     WBC Clumps Urine Present (*)     Mucus Urine Present (*)     RBC Urine 1      WBC Urine 12 (*)     Squamous Epithelials Urine 2 (*)    CBC WITH  PLATELETS - Abnormal    WBC Count 5.8      RBC Count 4.71      Hemoglobin 14.6      Hematocrit 45.1      MCV 96      MCH 31.0      MCHC 32.4      RDW 13.3      Platelet Count 138 (*)    BASIC METABOLIC PANEL - Abnormal    Sodium 141      Potassium 4.3      Chloride 107      Carbon Dioxide (CO2) 24      Anion Gap 10      Urea Nitrogen 10      Creatinine 0.83      Calcium 9.1      Glucose 214 (*)     GFR Estimate 88     URINE CULTURE       RADIOLOGY    US Testicular & Scrotum w Doppler Ltd   Final Result   IMPRESSION:   1.  Bilateral epididymal enlargement and small hydroceles have developed.   2.  Scrotal ultrasound is otherwise normal.         CT Abdomen Pelvis w Contrast   Final Result   IMPRESSION:       1.  Colonic diverticulosis without evidence for diverticulitis.      2.  No bowel obstruction. No free fluid or air.      3.  Small fat-containing periumbilical hernia. No inguinal hernia.                DISCHARGE MEDS  New Prescriptions    SULFAMETHOXAZOLE-TRIMETHOPRIM (BACTRIM DS) 800-160 MG TABLET    Take 1 tablet by mouth 2 times daily for 10 days       I, Latha Garcia, am serving as a scribe to document services personally performed by Airam Basilio PA-C based on my observation and the provider's statements to me. I, Airam Basilio PA-C attest that Latha Garcia is acting in a scribe capacity, has observed my performance of the services and has documented them in accordance with my direction.      Airam Basilio PA-C  [facility] EMERGENCY DEPARTMENT  [address] [place] [id number]  [phone]     Airam Basilio PA-C  05/02/22 [id number]

## 2022-05-02 NOTE — ED TRIAGE NOTES
Patient reports left sided lower abdominal pain and groin pain that sometimes radiates down his left left leg. Patient started last night. Denies difficulty urinating.

## 2022-05-02 NOTE — DISCHARGE INSTRUCTIONS
Please use Tylenol or ibuprofen as needed for your discomfort.  I would encourage you to take the antibiotics as written based on your urinalysis results.  Also please follow-up through primary care if there is ongoing symptoms.    Because of one of your medicaitons, we are choosing a different antibiotic than we thought at first, your new antibiotic is called bactrim. You will need to take this twice a day for 10 days. Make sure that you are drinking enough water throughout the day.    Come back to the emergency department if you develop a fever or your pain gets worse despite taking the antibiotics.

## 2022-05-04 LAB — BACTERIA UR CULT: NORMAL

## 2022-05-08 ENCOUNTER — HOSPITAL ENCOUNTER (EMERGENCY)
Facility: HOSPITAL | Age: 82
Discharge: HOME | End: 2022-05-08
Attending: EMERGENCY MEDICINE | Admitting: EMERGENCY MEDICINE
Payer: COMMERCIAL

## 2022-05-08 ENCOUNTER — APPOINTMENT (OUTPATIENT)
Dept: CT IMAGING | Facility: HOSPITAL | Age: 82
End: 2022-05-08
Attending: EMERGENCY MEDICINE
Payer: COMMERCIAL

## 2022-05-08 VITALS
WEIGHT: 175 LBS | RESPIRATION RATE: 16 BRPM | SYSTOLIC BLOOD PRESSURE: 122 MMHG | DIASTOLIC BLOOD PRESSURE: 56 MMHG | OXYGEN SATURATION: 97 % | TEMPERATURE: 97.9 F | BODY MASS INDEX: 27.47 KG/M2 | HEART RATE: 80 BPM | HEIGHT: 67 IN

## 2022-05-08 DIAGNOSIS — N20.1 URETEROLITHIASIS: ICD-10-CM

## 2022-05-08 LAB
ALBUMIN UR-MCNC: NEGATIVE MG/DL
ANION GAP SERPL CALCULATED.3IONS-SCNC: 11 MMOL/L (ref 5–18)
APPEARANCE UR: CLEAR
BASOPHILS # BLD AUTO: 0.1 10E3/UL (ref 0–0.2)
BASOPHILS NFR BLD AUTO: 1 %
BILIRUB UR QL STRIP: NEGATIVE
BUN SERPL-MCNC: 10 MG/DL (ref 8–28)
CALCIUM SERPL-MCNC: 9.5 MG/DL (ref 8.5–10.5)
CHLORIDE BLD-SCNC: 106 MMOL/L (ref 98–107)
CO2 SERPL-SCNC: 22 MMOL/L (ref 22–31)
COLOR UR AUTO: NORMAL
CREAT SERPL-MCNC: 1.07 MG/DL (ref 0.7–1.3)
EOSINOPHIL # BLD AUTO: 0.4 10E3/UL (ref 0–0.7)
EOSINOPHIL NFR BLD AUTO: 5 %
ERYTHROCYTE [DISTWIDTH] IN BLOOD BY AUTOMATED COUNT: 13.4 % (ref 10–15)
GFR SERPL CREATININE-BSD FRML MDRD: 70 ML/MIN/1.73M2
GLUCOSE BLD-MCNC: 61 MG/DL (ref 70–125)
GLUCOSE BLDC GLUCOMTR-MCNC: 42 MG/DL (ref 70–99)
GLUCOSE BLDC GLUCOMTR-MCNC: 80 MG/DL (ref 70–99)
GLUCOSE UR STRIP-MCNC: NEGATIVE MG/DL
HCT VFR BLD AUTO: 46.4 % (ref 40–53)
HGB BLD-MCNC: 15.4 G/DL (ref 13.3–17.7)
HGB UR QL STRIP: NEGATIVE
HOLD SPECIMEN: NORMAL
IMM GRANULOCYTES # BLD: 0 10E3/UL
IMM GRANULOCYTES NFR BLD: 1 %
KETONES UR STRIP-MCNC: NEGATIVE MG/DL
LEUKOCYTE ESTERASE UR QL STRIP: NEGATIVE
LYMPHOCYTES # BLD AUTO: 2.6 10E3/UL (ref 0.8–5.3)
LYMPHOCYTES NFR BLD AUTO: 32 %
MCH RBC QN AUTO: 31.3 PG (ref 26.5–33)
MCHC RBC AUTO-ENTMCNC: 33.2 G/DL (ref 31.5–36.5)
MCV RBC AUTO: 94 FL (ref 78–100)
MONOCYTES # BLD AUTO: 1.2 10E3/UL (ref 0–1.3)
MONOCYTES NFR BLD AUTO: 14 %
NEUTROPHILS # BLD AUTO: 3.9 10E3/UL (ref 1.6–8.3)
NEUTROPHILS NFR BLD AUTO: 47 %
NITRATE UR QL: NEGATIVE
NRBC # BLD AUTO: 0 10E3/UL
NRBC BLD AUTO-RTO: 0 /100
PH UR STRIP: 6.5 [PH] (ref 5–7)
PLATELET # BLD AUTO: 159 10E3/UL (ref 150–450)
POTASSIUM BLD-SCNC: 4.4 MMOL/L (ref 3.5–5)
RBC # BLD AUTO: 4.92 10E6/UL (ref 4.4–5.9)
RBC URINE: 1 /HPF
SODIUM SERPL-SCNC: 139 MMOL/L (ref 136–145)
SP GR UR STRIP: 1.02 (ref 1–1.03)
SQUAMOUS EPITHELIAL: 1 /HPF
UROBILINOGEN UR STRIP-MCNC: <2 MG/DL
WBC # BLD AUTO: 8.1 10E3/UL (ref 4–11)
WBC URINE: 1 /HPF

## 2022-05-08 PROCEDURE — 74176 CT ABD & PELVIS W/O CONTRAST: CPT

## 2022-05-08 PROCEDURE — 81001 URINALYSIS AUTO W/SCOPE: CPT | Performed by: EMERGENCY MEDICINE

## 2022-05-08 PROCEDURE — 250N000011 HC RX IP 250 OP 636: Performed by: EMERGENCY MEDICINE

## 2022-05-08 PROCEDURE — 85025 COMPLETE CBC W/AUTO DIFF WBC: CPT | Performed by: EMERGENCY MEDICINE

## 2022-05-08 PROCEDURE — 96374 THER/PROPH/DIAG INJ IV PUSH: CPT

## 2022-05-08 PROCEDURE — 99285 EMERGENCY DEPT VISIT HI MDM: CPT | Mod: 25

## 2022-05-08 PROCEDURE — 96375 TX/PRO/DX INJ NEW DRUG ADDON: CPT

## 2022-05-08 PROCEDURE — 80048 BASIC METABOLIC PNL TOTAL CA: CPT | Performed by: EMERGENCY MEDICINE

## 2022-05-08 PROCEDURE — 36415 COLL VENOUS BLD VENIPUNCTURE: CPT | Performed by: EMERGENCY MEDICINE

## 2022-05-08 RX ORDER — OXYCODONE AND ACETAMINOPHEN 5; 325 MG/1; MG/1
1 TABLET ORAL EVERY 6 HOURS PRN
Qty: 6 TABLET | Refills: 0 | Status: SHIPPED | OUTPATIENT
Start: 2022-05-08 | End: 2022-05-11

## 2022-05-08 RX ORDER — KETOROLAC TROMETHAMINE 30 MG/ML
15 INJECTION, SOLUTION INTRAMUSCULAR; INTRAVENOUS ONCE
Status: COMPLETED | OUTPATIENT
Start: 2022-05-08 | End: 2022-05-08

## 2022-05-08 RX ORDER — ONDANSETRON 2 MG/ML
4 INJECTION INTRAMUSCULAR; INTRAVENOUS ONCE
Status: COMPLETED | OUTPATIENT
Start: 2022-05-08 | End: 2022-05-08

## 2022-05-08 RX ADMIN — ONDANSETRON 4 MG: 2 INJECTION INTRAMUSCULAR; INTRAVENOUS at 17:15

## 2022-05-08 RX ADMIN — KETOROLAC TROMETHAMINE 15 MG: 30 INJECTION, SOLUTION INTRAMUSCULAR at 17:14

## 2022-05-08 ASSESSMENT — ENCOUNTER SYMPTOMS
ABDOMINAL PAIN: 1
FLANK PAIN: 1

## 2022-05-08 NOTE — ED PROVIDER NOTES
EMERGENCY DEPARTMENT ENCOUNTER      NAME: Dwain Andrade  AGE: 81 year old male  YOB: 1940  MRN: [id number]  EVALUATION DATE & TIME: 2022  4:32 PM    PCP: Patt Sheikh    ED PROVIDER: Jeremy Galdamez M.D.      Chief Complaint   Patient presents with     Abdominal Pain         FINAL IMPRESSION:  1.  Acute left abdominal pain/flank pain.  2.  Suspected left ureterolithiasis with passed stone.    ED COURSE & MEDICAL DECISION MAKIN:45 PM I met with the patient to gather history and to perform my initial exam. We discussed plans for the ED course, including diagnostic testing and treatment. PPE worn: cloth mask.  Patient seen 5 days ago and diagnosed with UTI.  Presenting at that time with UTI symptoms and abdominal pain.  Patient prescribed Bactrim.  Patient notes no improvement and notes continued pain in the left abdomen and left flank area.  Chart indicates a history in the past of acute renal failure, diabetes, GERD, pyelonephritis and distant appendectomy.  5:26 PM Nursing reports the patient had a blood sugar of 42. He was given 2 orange juices and sugar. Plan to recheck blood sugar in an hour.  6:50 PM.  Repeat blood sugar 80.  Initial lab work unremarkable other than urinalysis still pending.  7:49 PM.  Urinalysis negative at this time.  CBC and chemistries negative.  Blood sugar improved to normal ranges.  Patient feels somewhat better after Toradol IV.  CAT scan showing no acute findings.  2 tiny left renal stones are noted.  No hydronephrosis or hydroureter.  Incidental diverticulosis.  I suspect the patient may have passed a small stone.  Patient will be discharged home with pain medication and follow-up with his clinic this week.  He is in agreement with the plan.    Pertinent Labs & Imaging studies reviewed. (See chart for details)    81 year old male presents to the Emergency Department for evaluation of left flank pain.    At the conclusion of the encounter I discussed the  results of all of the tests and the disposition. The questions were answered. The patient or family acknowledged understanding and was agreeable with the care plan.            MEDICATIONS GIVEN IN THE EMERGENCY:  Medications   ondansetron (ZOFRAN) injection 4 mg (4 mg Intravenous Given 5/8/22 1715)   ketorolac (TORADOL) injection 15 mg (15 mg Intravenous Given 5/8/22 1714)       NEW PRESCRIPTIONS STARTED AT TODAY'S ER VISIT  New Prescriptions    No medications on file          =================================================================    HPI    Patient information was obtained from: Patient     Use of : N/A        Dwain Andrade is a 81 year old male with a pertinent history of diverticular disease of large intestine, colon polyps, diabetes mellitus type 2, GERD, hyperlipidemia who presents to this ED by walk in for evaluation of abdominal pain. Patient endorses LLQ abdominal pain/ left sided flank pain for since 05/01. He was seen for his symptoms yesterday and prescribed an antibiotic which he has been taking. However, patient is continuing to have symptoms and decided to report to the ED again. He personally denies a history of pyelonephritis, kidney stones. No other reported complaints at this time.    Per chart review, patient was seen at this ED on 05/02/2022. Patient presented with LLQ abdominal pain which started the day prior. Pain was sharp and intermittent with some radiation into his back and into the testicles. CT abdomen with contrast resulted with diverticulosis without diverticulitis, no acute process. Ultrasound of the testicles resulted Bilateral epididymal enlargement and small hydroceles have developed, but was otherwise normal. UA was positive for infection. Patient was discharged on a 10 day course of Bactrim BID for 10 days.    Does not identify any waxing or waning symptoms otherwise, exacerbating or alleviating features,associated symptoms except as mentioned.    REVIEW OF  SYSTEMS   Review of Systems   Gastrointestinal: Positive for abdominal pain (LLQ).   Genitourinary: Positive for flank pain (left sided).   All other systems reviewed and are negative.       PAST MEDICAL HISTORY:  Past Medical History:   Diagnosis Date     Arthritis      Diabetes (H)      GERD (gastroesophageal reflux disease)      High cholesterol      Hyperlipidemia      Kidney infection        PAST SURGICAL HISTORY:  Past Surgical History:   Procedure Laterality Date     ABDOMEN SURGERY      Diverticulitis     APPENDECTOMY       KNEE SURGERY       NEEDLE BIOPSY, PROSTATE N/A 11/15/2021    Procedure: TRANSRECTAL ULTRSOUND GUIDED PROSTATE BIOPSY;  Surgeon: Alfonso Morales MD;  Location: [facility]           CURRENT MEDICATIONS:    acetaminophen (TYLENOL) 325 MG tablet  albuterol (PROAIR HFA;PROVENTIL HFA;VENTOLIN HFA) 90 mcg/actuation inhaler  aspirin 81 mg chewable tablet  blood glucose test strips  CALCIUM CARBONATE/MAG HYDROX (ROLAIDS ORAL)  glipiZIDE (GLUCOTROL) 5 MG tablet  HYDROcodone-acetaminophen 5-325 mg per tablet  ibuprofen (ADVIL,MOTRIN) 200 MG tablet  metFORMIN (GLUCOPHAGE) 1000 MG tablet  naproxen (NAPROSYN) 500 MG tablet  omeprazole (PRILOSEC) 40 MG capsule  pseudoephedrine (SUDAFED) 60 MG tablet  simvastatin (ZOCOR) 10 MG tablet  sulfamethoxazole-trimethoprim (BACTRIM DS) 800-160 MG tablet        ALLERGIES:  No Known Allergies    FAMILY HISTORY:  History reviewed. No pertinent family history.    SOCIAL HISTORY:   Social History     Socioeconomic History     Marital status: Single     Spouse name: None     Number of children: None     Years of education: None     Highest education level: None   Tobacco Use     Smoking status: Current Some Day Smoker     Packs/day: 0.00     Types: Cigarettes     Smokeless tobacco: Never Used     Tobacco comment: 1 pack per week   Substance and Sexual Activity     Alcohol use: Yes     Comment: Alcoholic Drinks/day: Occasionally     Drug use: Not Currently    Social History Narrative    5/21/2018: Currently lives alone and retired from [employer] in [place].     Chart review indicates occasional tobacco and alcohol use.  No drugs.    VITALS:  BP (!) 145/82   Pulse 82   Temp 97.9  F (36.6  C) (Temporal)   Resp 16   Wt 79.4 kg (175 lb)   SpO2 97%   BMI 27.41 kg/m      PHYSICAL EXAM    Vital Signs:  BP (!) 145/82   Pulse 82   Temp 97.9  F (36.6  C) (Temporal)   Resp 16   Wt 79.4 kg (175 lb)   SpO2 97%   BMI 27.41 kg/m    General:  On entering the room he is in no apparent distress.    Neck:  Neck supple with full range of motion and nontender.    Back:  Back and spine are nontender.  No costovertebral angle tenderness.  Patient tender in the left flank.  HEENT:  Oropharynx clear with moist mucous membranes.  HEENT unremarkable.    Pulmonary:  Chest clear to auscultation without rhonchi rales or wheezing.    Cardiovascular:  Cardiac regular rate and rhythm without murmurs rubs or gallops.    Abdomen:  Abdomen soft nontender except tenderness in the lateral left abdomen and left flank..  There is no rebound or guarding.    Muskuloskeletal:  he moves all 4 without any difficulty and has normal neurovascular exams.  Extremities without clubbing, cyanosis, or edema.  Legs and calves are nontender.    Neuro:  he is alert and oriented ×3 and moves all extremities symmetrically.    Psych:  Normal affect.    Skin:  Unremarkable and warm and dry.       LAB:  All pertinent labs reviewed and interpreted.  Labs Ordered and Resulted from Time of ED Arrival to Time of ED Departure   BASIC METABOLIC PANEL - Abnormal       Result Value    Sodium 139      Potassium 4.4      Chloride 106      Carbon Dioxide (CO2) 22      Anion Gap 11      Urea Nitrogen 10      Creatinine 1.07      Calcium 9.5      Glucose 61 (*)     GFR Estimate 70     GLUCOSE BY METER - Abnormal    GLUCOSE BY METER POCT 42 (*)    ROUTINE UA WITH MICROSCOPIC REFLEX TO CULTURE - Normal    Color Urine  Light Yellow      Appearance Urine Clear      Glucose Urine Negative      Bilirubin Urine Negative      Ketones Urine Negative      Specific Gravity Urine 1.017      Blood Urine Negative      pH Urine 6.5      Protein Albumin Urine Negative      Urobilinogen Urine <2.0      Nitrite Urine Negative      Leukocyte Esterase Urine Negative      RBC Urine 1      WBC Urine 1      Squamous Epithelials Urine 1     GLUCOSE BY METER - Normal    GLUCOSE BY METER POCT 80     CBC WITH PLATELETS AND DIFFERENTIAL    WBC Count 8.1      RBC Count 4.92      Hemoglobin 15.4      Hematocrit 46.4      MCV 94      MCH 31.3      MCHC 33.2      RDW 13.4      Platelet Count 159      % Neutrophils 47      % Lymphocytes 32      % Monocytes 14      % Eosinophils 5      % Basophils 1      % Immature Granulocytes 1      NRBCs per 100 WBC 0      Absolute Neutrophils 3.9      Absolute Lymphocytes 2.6      Absolute Monocytes 1.2      Absolute Eosinophils 0.4      Absolute Basophils 0.1      Absolute Immature Granulocytes 0.0      Absolute NRBCs 0.0     GLUCOSE MONITOR NURSING POCT   GLUCOSE MONITOR NURSING POCT       RADIOLOGY:  Reviewed all pertinent imaging. Please see official radiology report.  Abd/pelvis CT no contrast - Stone Protocol   Final Result   IMPRESSION:    1.  No definite etiology for symptoms. There are 2 very tiny stones within left kidney but no hydronephrosis or ureteral stone identified.   2.  Extensive colonic diverticulosis without definite diverticulitis.                    EKG:          PROCEDURES:         I, Mayo Johnson, am serving as a scribe to document services personally performed by Dr. Galdamez based on my observation and the provider's statements to me. I, Jeremy Galdamez MD attest that he is acting in a scribe capacity, has observed my performance of the services and has documented them in accordance with my direction.    Jeremy Galdamez M.D.  Emergency Medicine  [facility]  [facility] EMERGENCY DEPARTMENT  [address] [place] [id number]  [phone]  Dept: [phone]     Jeremy Galdamez MD  05/08/22 1950

## 2022-05-08 NOTE — ED TRIAGE NOTES
"Pt reports being recently diagnosed with UTI for which he is taking an antibiotic. This afternoon, a \"sharp, stabbing\" pain came back in his left abdomen/flank area. No urinary symptoms reported. No fevers or chills.       "

## 2022-05-09 NOTE — DISCHARGE INSTRUCTIONS
Ice or heat off-and-on to sore areas can help with pain.  Encourage fluids.  Over-the-counter Tylenol or ibuprofen every 6 hours as needed for pain.  1 Percocet every 6-8 hours if needed for stronger pain.  Do not drive with this.  Follow-up with your clinic this week for reevaluation.  See your clinic sooner if worse or problems.    2 tiny kidney stones are noted in the left kidney.  I suspect that you may have passed a small kidney stone as source of your left flank pain.

## 2023-01-24 ENCOUNTER — NURSE TRIAGE (OUTPATIENT)
Dept: NURSING | Facility: CLINIC | Age: 83
End: 2023-01-24
Payer: MEDICARE

## 2023-01-25 NOTE — TELEPHONE ENCOUNTER
Patient is having elevated blood sugars.  Patient states he is not sure why his blood sugar was elevated.  He ate recommended amounts and had some licorice too.    BG : 344    Patient takes Metformin and glipiside, type 2 non insulin diabetic.    Patient is alert, not weak, no vomiting, no rapid breathing, is feeling well.    Care advise: drink 1 8oz glass of water every hour for the next 4 hours.  Recheck glood sugar.  If higher than 300 call [facility] nurse line to follow up with primary provider.  Gave number [phone], if under 300 then go to bed and call clinic in morning to report the higher blood sugar.  Call back if vomiting, rapid breathing, or symptoms become worse or develop.    Julianna Bridges RN   01/24/23 6:39 PM  [facility] Nurse Advisor    Reason for Disposition    [1] Blood glucose > 300 mg/dL (16.7 mmol/L) AND [2] does not  use insulin (e.g., not insulin-dependent; most people with type 2 diabetes)    Additional Information    Negative: Unconscious or difficult to awaken    Negative: Acting confused (e.g., disoriented, slurred speech)    Negative: Very weak (e.g., can't stand)    Negative: Sounds like a life-threatening emergency to the triager    Negative: [1] Vomiting AND [2] signs of dehydration (e.g., very dry mouth, lightheaded, dark urine)    Negative: [1] Blood glucose > 240 mg/dL (13.3 mmol/L) AND [2] rapid breathing    Negative: Blood glucose > 500 mg/dL (27.8 mmol/L)    Negative: [1] Blood glucose > 240 mg/dL (13.3 mmol/L) AND [2] urine ketones moderate-large (or more than 1+)    Negative: [1] Blood glucose > 240 mg/dL (13.3 mmol/L) AND [2] blood ketones > 1.4 mmol/L    Negative: [1] Blood glucose > 240 mg/dL (13.3 mmol/L) AND [2] vomiting AND [3] unable to check for ketones (in blood or urine)    Negative: [1] New-onset diabetes suspected (e.g., frequent urination, weak, weight loss) AND [2] vomiting or rapid breathing    Negative: Vomiting lasts > 4 hours    Negative: Patient  sounds very sick or weak to the triager    Negative: Fever > 100.4 F (38.0 C)    Negative: Blood glucose > 400 mg/dL (22.2 mmol/L)    Negative: [1] Blood glucose > 300 mg/dL (16.7 mmol/L) AND [2] two or more times in a row    Negative: Urine ketones moderate - large (or blood ketones > 1.4 mmol/L)    Negative: [1] Caller has URGENT medication or insulin pump question AND [2] triager unable to answer question    Negative: [1] Symptoms of high blood sugar (e.g., frequent urination, weak, weight loss) AND [2] not able to test blood glucose    Negative: New-onset diabetes suspected (e.g., frequent urination, weakness, weight loss)    Negative: [1] Caller has NON-URGENT medication or insulin pump question AND [2] triager unable to answer question    Negative: [1] Blood glucose > 300 mg/dL (16.7 mmol/L) AND [2] uses insulin (e.g., insulin-dependent, all people with type 1 diabetes)    Negative: [1] Blood glucose 240 - 300 mg/dL (13.3 - 16.7 mmol/L) AND [2] uses insulin (e.g., insulin-dependent, all people with type 1 diabetes)    Protocols used: DIABETES - HIGH BLOOD SUGAR-A-

## 2023-05-12 ENCOUNTER — APPOINTMENT (OUTPATIENT)
Dept: CT IMAGING | Facility: HOSPITAL | Age: 83
End: 2023-05-12
Attending: EMERGENCY MEDICINE
Payer: COMMERCIAL

## 2023-05-12 ENCOUNTER — HOSPITAL ENCOUNTER (EMERGENCY)
Facility: HOSPITAL | Age: 83
Discharge: HOME | End: 2023-05-12
Attending: EMERGENCY MEDICINE | Admitting: EMERGENCY MEDICINE
Payer: COMMERCIAL

## 2023-05-12 VITALS
HEART RATE: 73 BPM | OXYGEN SATURATION: 97 % | BODY MASS INDEX: 27.47 KG/M2 | SYSTOLIC BLOOD PRESSURE: 137 MMHG | WEIGHT: 175 LBS | DIASTOLIC BLOOD PRESSURE: 67 MMHG | HEIGHT: 67 IN | TEMPERATURE: 98 F | RESPIRATION RATE: 16 BRPM

## 2023-05-12 DIAGNOSIS — R10.32 ABDOMINAL PAIN, LEFT LOWER QUADRANT: ICD-10-CM

## 2023-05-12 LAB
ALBUMIN UR-MCNC: NEGATIVE MG/DL
ANION GAP SERPL CALCULATED.3IONS-SCNC: 11 MMOL/L (ref 7–15)
APPEARANCE UR: CLEAR
BACTERIA #/AREA URNS HPF: ABNORMAL /HPF
BILIRUB UR QL STRIP: NEGATIVE
BUN SERPL-MCNC: 13.3 MG/DL (ref 8–23)
CALCIUM SERPL-MCNC: 9 MG/DL (ref 8.8–10.2)
CHLORIDE SERPL-SCNC: 106 MMOL/L (ref 98–107)
COLOR UR AUTO: ABNORMAL
CREAT SERPL-MCNC: 0.9 MG/DL (ref 0.67–1.17)
DEPRECATED HCO3 PLAS-SCNC: 22 MMOL/L (ref 22–29)
ERYTHROCYTE [DISTWIDTH] IN BLOOD BY AUTOMATED COUNT: 13.3 % (ref 10–15)
GFR SERPL CREATININE-BSD FRML MDRD: 86 ML/MIN/1.73M2
GLUCOSE SERPL-MCNC: 167 MG/DL (ref 70–99)
GLUCOSE UR STRIP-MCNC: NEGATIVE MG/DL
HCT VFR BLD AUTO: 45.1 % (ref 40–53)
HGB BLD-MCNC: 14.9 G/DL (ref 13.3–17.7)
HGB UR QL STRIP: NEGATIVE
KETONES UR STRIP-MCNC: NEGATIVE MG/DL
LACTATE SERPL-SCNC: 1.8 MMOL/L (ref 0.7–2)
LEUKOCYTE ESTERASE UR QL STRIP: ABNORMAL
MCH RBC QN AUTO: 31.7 PG (ref 26.5–33)
MCHC RBC AUTO-ENTMCNC: 33 G/DL (ref 31.5–36.5)
MCV RBC AUTO: 96 FL (ref 78–100)
MUCOUS THREADS #/AREA URNS LPF: PRESENT /LPF
NITRATE UR QL: NEGATIVE
PH UR STRIP: 7 [PH] (ref 5–7)
PLATELET # BLD AUTO: 141 10E3/UL (ref 150–450)
POTASSIUM SERPL-SCNC: 4.4 MMOL/L (ref 3.4–5.3)
RBC # BLD AUTO: 4.7 10E6/UL (ref 4.4–5.9)
RBC URINE: 1 /HPF
SODIUM SERPL-SCNC: 139 MMOL/L (ref 136–145)
SP GR UR STRIP: 1.01 (ref 1–1.03)
SQUAMOUS EPITHELIAL: 1 /HPF
UROBILINOGEN UR STRIP-MCNC: <2 MG/DL
WBC # BLD AUTO: 5.9 10E3/UL (ref 4–11)
WBC URINE: 3 /HPF

## 2023-05-12 PROCEDURE — 83605 ASSAY OF LACTIC ACID: CPT | Performed by: EMERGENCY MEDICINE

## 2023-05-12 PROCEDURE — 96374 THER/PROPH/DIAG INJ IV PUSH: CPT

## 2023-05-12 PROCEDURE — 250N000011 HC RX IP 250 OP 636: Performed by: EMERGENCY MEDICINE

## 2023-05-12 PROCEDURE — 80048 BASIC METABOLIC PNL TOTAL CA: CPT | Performed by: EMERGENCY MEDICINE

## 2023-05-12 PROCEDURE — 81001 URINALYSIS AUTO W/SCOPE: CPT | Performed by: EMERGENCY MEDICINE

## 2023-05-12 PROCEDURE — 74177 CT ABD & PELVIS W/CONTRAST: CPT

## 2023-05-12 PROCEDURE — 96361 HYDRATE IV INFUSION ADD-ON: CPT

## 2023-05-12 PROCEDURE — 99285 EMERGENCY DEPT VISIT HI MDM: CPT | Mod: 25

## 2023-05-12 PROCEDURE — 258N000003 HC RX IP 258 OP 636: Performed by: EMERGENCY MEDICINE

## 2023-05-12 PROCEDURE — 36415 COLL VENOUS BLD VENIPUNCTURE: CPT | Performed by: EMERGENCY MEDICINE

## 2023-05-12 PROCEDURE — 85014 HEMATOCRIT: CPT | Performed by: EMERGENCY MEDICINE

## 2023-05-12 RX ORDER — MORPHINE SULFATE 4 MG/ML
4 INJECTION, SOLUTION INTRAMUSCULAR; INTRAVENOUS ONCE
Status: COMPLETED | OUTPATIENT
Start: 2023-05-12 | End: 2023-05-12

## 2023-05-12 RX ORDER — ATORVASTATIN CALCIUM 20 MG/1
20 TABLET, FILM COATED ORAL DAILY
COMMUNITY

## 2023-05-12 RX ORDER — HYDROCODONE BITARTRATE AND ACETAMINOPHEN 5; 325 MG/1; MG/1
0.5 TABLET ORAL EVERY 6 HOURS PRN
Qty: 10 TABLET | Refills: 0 | Status: SHIPPED | OUTPATIENT
Start: 2023-05-12 | End: 2023-05-15

## 2023-05-12 RX ORDER — SODIUM CHLORIDE 9 MG/ML
INJECTION, SOLUTION INTRAVENOUS CONTINUOUS
Status: DISCONTINUED | OUTPATIENT
Start: 2023-05-12 | End: 2023-05-12 | Stop reason: HOSPADM

## 2023-05-12 RX ORDER — IOPAMIDOL 755 MG/ML
90 INJECTION, SOLUTION INTRAVASCULAR ONCE
Status: COMPLETED | OUTPATIENT
Start: 2023-05-12 | End: 2023-05-12

## 2023-05-12 RX ADMIN — MORPHINE SULFATE 4 MG: 4 INJECTION, SOLUTION INTRAMUSCULAR; INTRAVENOUS at 10:35

## 2023-05-12 RX ADMIN — SODIUM CHLORIDE: 9 INJECTION, SOLUTION INTRAVENOUS at 10:31

## 2023-05-12 RX ADMIN — IOPAMIDOL 90 ML: 755 INJECTION, SOLUTION INTRAVENOUS at 11:38

## 2023-05-12 ASSESSMENT — ACTIVITIES OF DAILY LIVING (ADL)
ADLS_ACUITY_SCORE: 35
ADLS_ACUITY_SCORE: 35

## 2023-05-12 NOTE — ED TRIAGE NOTES
Patient presents here for evaluation of lower left abdominal pain that has occurred intermittently since 2 am. He notes a history of both kidney stones and diverticulitis. He denies diarrhea, blood or mucus in his stools. Urine color has been normal.

## 2023-05-12 NOTE — ED NOTES
Agree with the triage note    States the pain lasted about 15-20 seconds then it is gone. But he says the pain is there about every 60-90 minutes. The pain is very low in his left abdomen. Denies nausea, vomiting diarrhea.

## 2023-05-12 NOTE — ED PROVIDER NOTES
EMERGENCY DEPARTMENT ENCOUNTER      NAME: Dwain Andrade  AGE: 82 year old male  YOB: 1940  MRN: [id number]  EVALUATION DATE & TIME: 5/12/2023 10:08 AM    PCP: Patt Sheikh    ED PROVIDER: Ritchie Darlign M.D.      Chief Complaint   Patient presents with     Abdominal Pain         FINAL IMPRESSION:  Abdominal pain      ED COURSE & MEDICAL DECISION MAKING:    Pertinent Labs & Imaging studies reviewed. (See chart for details)  82 year old male presents to the Emergency Department for evaluation of left lower abdominal discomfort.  Describes a sharp achiness gets tends to come in waves.  Symptoms ongoing last few days.  Denies any urinary symptoms change in stools.  Does have a prior history of diverticulitis and kidney stones.  Indicates partial resection for prior diverticulitis.  On exam he is a well-nourished alert male with moderate abdominal obesity.  Hypoactive bowel sounds.  Moderate left lower quadrant and mild left upper quadrant tenderness.  Minimal CVA tenderness.  Patient likely with diverticular disease.  Possibility renal colic.  Urine analysis being obtained.  Laboratory evaluation being sent to assess for evidence of infectious process, electrolyte imbalance, renal impairment.  CT imaging the abdomen ordered.  Intravenous morphine initiated for discomfort and intravenous fluids.  Patient appears non toxic with stable vitals signs. Overall exam is benign.    10:17 AM  I met with the patient for the initial interview and physical examination. Discussed plan for treatment and workup in the ED.    11:02 AM.  Metabolic panel with moderately elevated glucose.  CBC unremarkable.  Lactic acid normal.  Awaiting CT and urine analysis.  11:45 AM.  Review of images indicate some inflammatories markings in the subcutaneous fat in the left lower quadrant.  No obvious intra-abdominal process.  Awaiting definitive report.  12:18 PM.  CT imaging report is unremarkable.  Previous colocolonic anastomosis.   No acute inflammatory changes.  No evidence of ureteral obstruction.  Awaiting urine analysis   12:37 PM I rechecked and updated the patient on CT results.  12:54 PM.  Urine analysis is unremarkable.  No evidence of infectious process.  Patient elderly male with episodes of sudden sharp left lower quadrant discomfort.  Could be post operative musculoskeletal issues.  No evidence of infectious or inflammatory bowel process.  No evidence of renal colic or pyelonephritis.  Recommendations are for continued outpatient management.  Limited prescription for hydrocodone.  Return immediately for any significant worsening or follow-up with your regular physician.    at the conclusion of the encounter I discussed the results of all of the tests and the disposition. The questions were answered and return precautions provided. The patient or family acknowledged understanding and was agreeable with the care plan.       Medical Decision Making    History:    Supplemental history from: Documented in chart, if applicable    External Record(s) reviewed: Documented in chart, if applicable.    Work Up:    Chart documentation includes differential considered and any EKGs or imaging independently interpreted by provider, where specified.    In additional to work up documented, I considered the following work up: Documented in chart, if applicable.    External consultation:    Discussion of management with another provider: None    Complicating factors:    Care impacted by chronic illness: Diabetes, Hyperlipidemia, Hypertension and Smoking / Nicotine Use    Care affected by social determinants of health: N/A    Disposition considerations: Discharge. I prescribed additional prescription strength medication(s) as charted. I considered admission, but discharged patient after significant clinical improvement.      MEDICATIONS GIVEN IN THE EMERGENCY:  Medications - No data to display    NEW PRESCRIPTIONS STARTED AT TODAY'S ER VISIT  Current  "Discharge Medication List      START taking these medications    Details   HYDROcodone-acetaminophen (NORCO) 5-325 MG tablet Take 0.5 tablets by mouth every 6 hours as needed for severe pain  Qty: 10 tablet, Refills: 0                =================================================================    HPI    Patient information was obtained from: patient     Use of Intrepreter: N/A       Dwain Andrade is a 82 year old male with a pertient medical history of diverticulitis, DM2, hyperlipidemia, GERD, and colon polyp who presents to the ED for evaluation of abdominal pain.    Since late last night the patient has had \"extreme\" 15-20 second episodes of LLQ pain every ~1 hour. He has taken some over the counter pain medications. He states that it feels similar to the kidney stone he had last year. He had surgery in 2005 for diverticulitis. He has no known allergies.    He denies bowel/bladder changes, nausea, vomiting, hematuria, dysuria, or any other complaints at this time.       REVIEW OF SYSTEMS   Constitutional:  Denies fever, chills  Respiratory:  Denies productive cough or increased work of breathing  Cardiovascular:  Denies chest pain, palpitations  GI:  Denies nausea, vomiting, or change in bowel or bladder habits. Positive for LLQ pain.   Musculoskeletal:  Denies any new muscle/joint swelling  Skin:  Denies rash   Neurologic:  Denies focal weakness  All systems negative except as marked.     PAST MEDICAL HISTORY:  Past Medical History:   Diagnosis Date     Arthritis      Diabetes (H)      GERD (gastroesophageal reflux disease)      High cholesterol      Hyperlipidemia      Kidney infection        PAST SURGICAL HISTORY:  Past Surgical History:   Procedure Laterality Date     ABDOMEN SURGERY      Diverticulitis     APPENDECTOMY       KNEE SURGERY       NEEDLE BIOPSY, PROSTATE N/A 11/15/2021    Procedure: TRANSRECTAL ULTRSOUND GUIDED PROSTATE BIOPSY;  Surgeon: Alfonso Morales MD;  Location: [facility] OR "         CURRENT MEDICATIONS:    No current facility-administered medications for this encounter.    Current Outpatient Medications:      acetaminophen (TYLENOL) 325 MG tablet, [ACETAMINOPHEN (TYLENOL) 325 MG TABLET] Take 2 tablets (650 mg total) by mouth every 6 (six) hours as needed for pain., Disp: 30 tablet, Rfl: 0     albuterol (PROAIR HFA;PROVENTIL HFA;VENTOLIN HFA) 90 mcg/actuation inhaler, [ALBUTEROL (PROAIR HFA;PROVENTIL HFA;VENTOLIN HFA) 90 MCG/ACTUATION INHALER] Inhale 2 puffs every 4 (four) hours as needed for wheezing., Disp: 1 Inhaler, Rfl: 0     aspirin 81 mg chewable tablet, [ASPIRIN 81 MG CHEWABLE TABLET] Chew 81 mg daily., Disp: , Rfl:      blood glucose test strips, [BLOOD GLUCOSE TEST STRIPS] Use 1 each As Directed as needed. Dispense brand per patient's insurance at pharmacy discretion.  ICD Code: E 11.9, Disp: 10 strip, Rfl: 0     CALCIUM CARBONATE/MAG HYDROX (ROLAIDS ORAL), [CALCIUM CARBONATE/MAG HYDROX (ROLAIDS ORAL)] Take 1 tablet by mouth every 4 (four) hours as needed., Disp: , Rfl:      glipiZIDE (GLUCOTROL) 5 MG tablet, Take by mouth daily , Disp: , Rfl:      HYDROcodone-acetaminophen 5-325 mg per tablet, [HYDROCODONE-ACETAMINOPHEN 5-325 MG PER TABLET] Take 1 tablet by mouth every 4 (four) hours as needed for pain., Disp: 10 tablet, Rfl: 0     ibuprofen (ADVIL,MOTRIN) 200 MG tablet, [IBUPROFEN (ADVIL,MOTRIN) 200 MG TABLET] Take 400 mg by mouth every 6 (six) hours as needed for pain.       , Disp: , Rfl:      metFORMIN (GLUCOPHAGE) 1000 MG tablet, [METFORMIN (GLUCOPHAGE) 1000 MG TABLET] Take 1 tablet (1,000 mg total) by mouth 2 (two) times a day with meals., Disp: 60 tablet, Rfl: 0     naproxen (NAPROSYN) 500 MG tablet, [NAPROXEN (NAPROSYN) 500 MG TABLET] Take 1 tablet (500 mg total) by mouth 2 (two) times a day with meals., Disp: 30 tablet, Rfl: 0     omeprazole (PRILOSEC) 40 MG capsule, [OMEPRAZOLE (PRILOSEC) 40 MG CAPSULE] Take 40 mg by mouth daily., Disp: , Rfl:      pseudoephedrine  "(SUDAFED) 60 MG tablet, [PSEUDOEPHEDRINE (SUDAFED) 60 MG TABLET] Take 1 tablet (60 mg total) by mouth every 4 (four) hours as needed for congestion., Disp: 24 tablet, Rfl: 0     simvastatin (ZOCOR) 10 MG tablet, [SIMVASTATIN (ZOCOR) 10 MG TABLET] Take 10 mg by mouth bedtime., Disp: , Rfl:     ALLERGIES:  No Known Allergies    FAMILY HISTORY:  History reviewed. No pertinent family history.    SOCIAL HISTORY:   Social History     Socioeconomic History     Marital status: Single     Spouse name: None     Number of children: None     Years of education: None     Highest education level: None   Tobacco Use     Smoking status: Some Days     Packs/day: 0.00     Types: Cigarettes     Smokeless tobacco: Never     Tobacco comments:     1 pack per week   Substance and Sexual Activity     Alcohol use: Yes     Comment: Alcoholic Drinks/day: Occasionally     Drug use: Not Currently   Social History Narrative    5/21/2018: Currently lives alone and retired from [employer] in [place].       VITALS:  Patient Vitals for the past 24 hrs:   BP Temp Temp src Pulse Resp SpO2 Height Weight   05/12/23 1003 (!) 149/79 98  F (36.7  C) Oral 86 16 97 % 1.702 m (5' 7\") 79.4 kg (175 lb)        PHYSICAL EXAM    Constitutional:  Awake, alert, in mild distress  HENT:  Normocephalic, Atraumatic. Bilateral external ears normal. Oropharynx moist. Nose normal. Neck- Normal range of motion with no guarding, No midline cervical tenderness, Supple, No stridor.   Eyes:  PERRL, EOMI with no signs of entrapment, Conjunctiva normal, No discharge.   Respiratory:  Normal breath sounds, No respiratory distress, No wheezing.    Cardiovascular:  Normal heart rate, Normal rhythm, No appreciable rubs or gallops.   GI:  Soft, No distension, No palpable masses. Obese abdomen. Moderate LLQ/left flank tenderness.   : Mild CVA tenderness.  Musculoskeletal:  Intact distal pulses, No edema. Good range of motion in all major joints. No tenderness to palpation " or major deformities noted.  Integument:  Warm, Dry, No erythema, No rash.   Neurologic:  Alert & oriented, Normal motor function, Normal sensory function, No focal deficits noted.   Psychiatric:  Affect normal, Judgment normal, Mood normal.     LAB:  All pertinent labs reviewed and interpreted.     Results for orders placed or performed during the hospital encounter of 05/12/23   CT Abdomen Pelvis w Contrast     Status: None    Narrative    EXAM: CT ABDOMEN PELVIS W CONTRAST  LOCATION: [facility]  DATE/TIME: 5/12/2023 11:37 AM CDT    INDICATION: Left lower quadrant left flank tenderness  COMPARISON: CT of the abdomen and pelvis without contrast 06/18/2022; CT of the abdomen and pelvis with contrast 05/02/2022  TECHNIQUE: CT scan of the abdomen and pelvis was performed following injection of IV contrast. Multiplanar reformats were obtained. Dose reduction techniques were used.  CONTRAST: isovue 370  90ml    FINDINGS:   LOWER CHEST: Unchanged band of atelectasis in the basal right lower lobe near the diaphragm. No new findings in the imaged basal chest.    HEPATOBILIARY: Focal fat infiltration near the gallbladder fossa.  No significant mass. Several unchanged hepatic cysts are present in segment 4. No bile duct dilatation. No calcified gallstones.    PANCREAS: Normal.    SPLEEN: Normal.    ADRENAL GLANDS: Normal.    KIDNEYS/BLADDER: No significant mass, stones, or hydronephrosis. There are simple or benign cysts. No follow up is needed.    BOWEL: Status post sigmoid colon resection with colocolonic anastomosis in the upper left pelvis. There are numerous diverticula present throughout the colon. No bowel wall thickening or focal diverticular inflammation. No inflammatory stranding in the   mesentery. The small bowel is normal in contour and caliber. Nondistended stomach.    LYMPH NODES: Normal.    VASCULATURE: Mixed attenuation atheroma is present throughout the abdominal aorta and common  iliac arteries but no aneurysm. 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    PELVIC ORGANS: Seminal vesicles are symmetric. Unchanged central heterogeneity of the prostate gland but no prostate gland enlargement.    MUSCULOSKELETAL: Small degenerative osteophytes lower thoracic and lumbar spine. Related disc space narrowing. SI joint osteoarthrosis, left greater than right.      Impression    IMPRESSION:     1.  Status post sigmoid colon resection. Extensive colonic diverticulosis but no findings to suggest acute diverticulitis or colitis.  2.  No urinary tract calculi or obstruction.   Basic metabolic panel     Status: Abnormal   Result Value Ref Range    Sodium 139 136 - 145 mmol/L    Potassium 4.4 3.4 - 5.3 mmol/L    Chloride 106 98 - 107 mmol/L    Carbon Dioxide (CO2) 22 22 - 29 mmol/L    Anion Gap 11 7 - 15 mmol/L    Urea Nitrogen 13.3 8.0 - 23.0 mg/dL    Creatinine 0.90 0.67 - 1.17 mg/dL    Calcium 9.0 8.8 - 10.2 mg/dL    Glucose 167 (H) 70 - 99 mg/dL    GFR Estimate 86 >60 mL/min/1.73m2   Lactic acid whole blood     Status: Normal   Result Value Ref Range    Lactic Acid 1.8 0.7 - 2.0 mmol/L   UA with Microscopic reflex to Culture     Status: Abnormal    Specimen: Urine, Midstream   Result Value Ref Range    Color Urine Light Yellow Colorless, Straw, Light Yellow, Yellow    Appearance Urine Clear Clear    Glucose Urine Negative Negative mg/dL    Bilirubin Urine Negative Negative    Ketones Urine Negative Negative mg/dL    Specific Gravity Urine 1.010 1.003 - 1.035    Blood Urine Negative Negative    pH Urine 7.0 5.0 - 7.0    Protein Albumin Urine Negative Negative mg/dL    Urobilinogen Urine <2.0 <2.0 mg/dL    Nitrite Urine Negative Negative    Leukocyte Esterase Urine 25 Umer/uL (A) Negative    Bacteria Urine Few (A) None Seen /HPF    Mucus Urine Present (A) None Seen /LPF    RBC Urine 1 <=2 /HPF    WBC Urine 3 <=5 /HPF    Squamous  Epithelials Urine 1 <=1 /HPF    Narrative    Urine Culture not indicated   CBC (+ platelets, no diff)     Status: Abnormal   Result Value Ref Range    WBC Count 5.9 4.0 - 11.0 10e3/uL    RBC Count 4.70 4.40 - 5.90 10e6/uL    Hemoglobin 14.9 13.3 - 17.7 g/dL    Hematocrit 45.1 40.0 - 53.0 %    MCV 96 78 - 100 fL    MCH 31.7 26.5 - 33.0 pg    MCHC 33.0 31.5 - 36.5 g/dL    RDW 13.3 10.0 - 15.0 %    Platelet Count 141 (L) 150 - 450 10e3/uL         I, Gage Rowe, am serving as a scribe to document services personally performed by Ritchie Darling MD, based on my observation and the provider's statements to me. I, Ritchie Darling MD attest that Gage Rowe is acting in a scribe capacity, has observed my performance of the services and has documented them in accordance with my direction.    Ritchie Darling M.D.  Emergency Medicine  [facility] EMERGENCY DEPARTMENT     Ritchie Darling MD  05/12/23 [id number]       Ritchie Darling MD  05/12/23 1257

## 2023-05-12 NOTE — PHARMACY-ADMISSION MEDICATION HISTORY
Pharmacist Admission Medication History    Admission medication history is complete. The information provided in this note is only as accurate as the sources available at the time of the update.    Medication reconciliation/reorder completed by provider prior to medication history? No    Information Source(s): Patient and CareEverywhere/SureScripts via in-person    Pertinent Information: none    Changes made to PTA medication list:    Added: atorvastatin    Deleted: albuterol inhaler, calcium carbonate/magnesium, hydrocodone, naproxen, pseudoephedrine, simvastatin    Changed: glipizide dose    Medication Affordability:  Not including over the counter (OTC) medications, was there a time in the past 3 months when you did not take your medications as prescribed because of cost?: No    Allergies reviewed with patient and updates made in EHR: yes    Medication History Completed By: Caterina Verdin [facility] 5/12/2023 10:54 AM      Prior to Admission medications    Medication Sig Last Dose Taking? Auth Provider Long Term End Date   acetaminophen (TYLENOL) 325 MG tablet [ACETAMINOPHEN (TYLENOL) 325 MG TABLET] Take 2 tablets (650 mg total) by mouth every 6 (six) hours as needed for pain. Past Week Yes Bernard Valentine MD     aspirin 81 mg chewable tablet [ASPIRIN 81 MG CHEWABLE TABLET] Chew 81 mg daily. 5/11/2023 at am Yes Provider, Historical     atorvastatin (LIPITOR) 20 MG tablet Take 20 mg by mouth daily 5/11/2023 at am Yes Unknown, Entered By History Yes    glipiZIDE (GLUCOTROL) 5 MG tablet Take 5 mg by mouth 2 times daily (before meals) 5/11/2023 at pm Yes Reported, Patient Yes    ibuprofen (ADVIL,MOTRIN) 200 MG tablet [IBUPROFEN (ADVIL,MOTRIN) 200 MG TABLET] Take 400 mg by mouth every 6 (six) hours as needed for pain.        Past Week Yes Provider, Historical     metFORMIN (GLUCOPHAGE) 1000 MG tablet [METFORMIN (GLUCOPHAGE) 1000 MG TABLET] Take 1 tablet (1,000 mg total) by mouth 2 (two) times a day with meals. 5/11/2023  at pm Yes Janette Casanova MD Yes    omeprazole (PRILOSEC) 40 MG capsule [OMEPRAZOLE (PRILOSEC) 40 MG CAPSULE] Take 40 mg by mouth daily. 5/11/2023 at am Yes Provider, Historical     blood glucose test strips [BLOOD GLUCOSE TEST STRIPS] Use 1 each As Directed as needed. Dispense brand per patient's insurance at pharmacy discretion.  ICD Code: E 11.9   Janette Casanova MD

## 2023-10-19 ENCOUNTER — NURSE TRIAGE (OUTPATIENT)
Dept: NURSING | Facility: CLINIC | Age: 83
End: 2023-10-19
Payer: COMMERCIAL

## 2023-10-19 ENCOUNTER — APPOINTMENT (OUTPATIENT)
Dept: CT IMAGING | Facility: HOSPITAL | Age: 83
DRG: 871 | End: 2023-10-19
Attending: STUDENT IN AN ORGANIZED HEALTH CARE EDUCATION/TRAINING PROGRAM
Payer: COMMERCIAL

## 2023-10-19 ENCOUNTER — HOSPITAL ENCOUNTER (INPATIENT)
Facility: HOSPITAL | Age: 83
LOS: 4 days | Discharge: HOME | DRG: 871 | End: 2023-10-23
Attending: STUDENT IN AN ORGANIZED HEALTH CARE EDUCATION/TRAINING PROGRAM | Admitting: INTERNAL MEDICINE
Payer: COMMERCIAL

## 2023-10-19 DIAGNOSIS — N30.00 ACUTE CYSTITIS WITHOUT HEMATURIA: ICD-10-CM

## 2023-10-19 DIAGNOSIS — N39.0 URINARY TRACT INFECTION WITHOUT HEMATURIA, SITE UNSPECIFIED: ICD-10-CM

## 2023-10-19 DIAGNOSIS — R78.81 GRAM-NEGATIVE BACTEREMIA: Primary | ICD-10-CM

## 2023-10-19 PROBLEM — D72.829 LEUKOCYTOSIS: Status: ACTIVE | Noted: 2023-10-19

## 2023-10-19 PROBLEM — R11.2 NAUSEA WITH VOMITING: Status: ACTIVE | Noted: 2023-10-19

## 2023-10-19 PROBLEM — M62.81 GENERALIZED MUSCLE WEAKNESS: Status: ACTIVE | Noted: 2023-10-19

## 2023-10-19 PROBLEM — A41.9 SEPSIS (H): Status: ACTIVE | Noted: 2023-10-19

## 2023-10-19 LAB
ACANTHOCYTES BLD QL SMEAR: NORMAL
ALBUMIN SERPL BCG-MCNC: 4 G/DL (ref 3.5–5.2)
ALBUMIN UR-MCNC: 10 MG/DL
ALP SERPL-CCNC: 80 U/L (ref 40–129)
ALT SERPL W P-5'-P-CCNC: 12 U/L (ref 0–70)
AMORPH CRY #/AREA URNS HPF: ABNORMAL /HPF
ANION GAP SERPL CALCULATED.3IONS-SCNC: 12 MMOL/L (ref 7–15)
APPEARANCE UR: ABNORMAL
AST SERPL W P-5'-P-CCNC: 14 U/L (ref 0–45)
ATRIAL RATE - MUSE: 91 BPM
AUER BODIES BLD QL SMEAR: NORMAL
BACTERIA #/AREA URNS HPF: ABNORMAL /HPF
BASO STIPL BLD QL SMEAR: NORMAL
BASO+EOS+MONOS # BLD AUTO: ABNORMAL 10*3/UL
BASO+EOS+MONOS NFR BLD AUTO: ABNORMAL %
BASOPHILS # BLD AUTO: 0.1 10E3/UL (ref 0–0.2)
BASOPHILS NFR BLD AUTO: 0 %
BILIRUB SERPL-MCNC: 0.7 MG/DL
BILIRUB UR QL STRIP: NEGATIVE
BITE CELLS BLD QL SMEAR: NORMAL
BLISTER CELLS BLD QL SMEAR: NORMAL
BUN SERPL-MCNC: 11.5 MG/DL (ref 8–23)
BURR CELLS BLD QL SMEAR: NORMAL
CALCIUM SERPL-MCNC: 9.8 MG/DL (ref 8.8–10.2)
CHLORIDE SERPL-SCNC: 104 MMOL/L (ref 98–107)
COLOR UR AUTO: YELLOW
CREAT SERPL-MCNC: 0.92 MG/DL (ref 0.67–1.17)
DACRYOCYTES BLD QL SMEAR: NORMAL
DEPRECATED HCO3 PLAS-SCNC: 24 MMOL/L (ref 22–29)
DIASTOLIC BLOOD PRESSURE - MUSE: 63 MMHG
EGFRCR SERPLBLD CKD-EPI 2021: 83 ML/MIN/1.73M2
ELLIPTOCYTES BLD QL SMEAR: NORMAL
EOSINOPHIL # BLD AUTO: 0.1 10E3/UL (ref 0–0.7)
EOSINOPHIL NFR BLD AUTO: 1 %
ERYTHROCYTE [DISTWIDTH] IN BLOOD BY AUTOMATED COUNT: 13.6 % (ref 10–15)
FRAGMENTS BLD QL SMEAR: NORMAL
GLUCOSE BLDC GLUCOMTR-MCNC: 138 MG/DL (ref 70–99)
GLUCOSE SERPL-MCNC: 139 MG/DL (ref 70–99)
GLUCOSE UR STRIP-MCNC: NEGATIVE MG/DL
HBA1C MFR BLD: 6.4 %
HCT VFR BLD AUTO: 42.1 % (ref 40–53)
HGB BLD-MCNC: 14.3 G/DL (ref 13.3–17.7)
HGB C CRYSTALS: NORMAL
HGB UR QL STRIP: NEGATIVE
HOWELL-JOLLY BOD BLD QL SMEAR: NORMAL
IMM GRANULOCYTES # BLD: 0.1 10E3/UL
IMM GRANULOCYTES NFR BLD: 1 %
INTERPRETATION ECG - MUSE: NORMAL
KETONES UR STRIP-MCNC: 10 MG/DL
LACTATE SERPL-SCNC: 0.9 MMOL/L (ref 0.7–2)
LEUKOCYTE ESTERASE UR QL STRIP: ABNORMAL
LIPASE SERPL-CCNC: 17 U/L (ref 13–60)
LYMPHOCYTES # BLD AUTO: 0.6 10E3/UL (ref 0.8–5.3)
LYMPHOCYTES NFR BLD AUTO: 5 %
MCH RBC QN AUTO: 32.1 PG (ref 26.5–33)
MCHC RBC AUTO-ENTMCNC: 34 G/DL (ref 31.5–36.5)
MCV RBC AUTO: 94 FL (ref 78–100)
MONOCYTES # BLD AUTO: 1.2 10E3/UL (ref 0–1.3)
MONOCYTES NFR BLD AUTO: 10 %
MUCOUS THREADS #/AREA URNS LPF: PRESENT /LPF
NEUTROPHILS # BLD AUTO: 10.1 10E3/UL (ref 1.6–8.3)
NEUTROPHILS NFR BLD AUTO: 83 %
NEUTS HYPERSEG BLD QL SMEAR: NORMAL
NITRATE UR QL: POSITIVE
NRBC # BLD AUTO: 0 10E3/UL
NRBC BLD AUTO-RTO: 0 /100
P AXIS - MUSE: 76 DEGREES
PH UR STRIP: 6.5 [PH] (ref 5–7)
PLAT MORPH BLD: NORMAL
PLATELET # BLD AUTO: 128 10E3/UL (ref 150–450)
POLYCHROMASIA BLD QL SMEAR: NORMAL
POTASSIUM SERPL-SCNC: 4.5 MMOL/L (ref 3.4–5.3)
PR INTERVAL - MUSE: 160 MS
PROCALCITONIN SERPL IA-MCNC: 0.19 NG/ML
PROT SERPL-MCNC: 6.9 G/DL (ref 6.4–8.3)
QRS DURATION - MUSE: 78 MS
QT - MUSE: 358 MS
QTC - MUSE: 440 MS
R AXIS - MUSE: -38 DEGREES
RBC # BLD AUTO: 4.46 10E6/UL (ref 4.4–5.9)
RBC AGGLUT BLD QL: NORMAL
RBC MORPH BLD: NORMAL
RBC URINE: 5 /HPF
ROULEAUX BLD QL SMEAR: NORMAL
SICKLE CELLS BLD QL SMEAR: NORMAL
SMUDGE CELLS BLD QL SMEAR: NORMAL
SODIUM SERPL-SCNC: 140 MMOL/L (ref 135–145)
SP GR UR STRIP: 1.02 (ref 1–1.03)
SPHEROCYTES BLD QL SMEAR: NORMAL
SQUAMOUS EPITHELIAL: 1 /HPF
STOMATOCYTES BLD QL SMEAR: NORMAL
SYSTOLIC BLOOD PRESSURE - MUSE: 134 MMHG
T AXIS - MUSE: 64 DEGREES
TARGETS BLD QL SMEAR: NORMAL
TOXIC GRANULES BLD QL SMEAR: NORMAL
TROPONIN T SERPL HS-MCNC: 8 NG/L
UROBILINOGEN UR STRIP-MCNC: <2 MG/DL
VARIANT LYMPHS BLD QL SMEAR: NORMAL
VENTRICULAR RATE- MUSE: 91 BPM
WBC # BLD AUTO: 12.1 10E3/UL (ref 4–11)
WBC URINE: 118 /HPF

## 2023-10-19 PROCEDURE — 96375 TX/PRO/DX INJ NEW DRUG ADDON: CPT

## 2023-10-19 PROCEDURE — 250N000013 HC RX MED GY IP 250 OP 250 PS 637: Performed by: INTERNAL MEDICINE

## 2023-10-19 PROCEDURE — 96361 HYDRATE IV INFUSION ADD-ON: CPT

## 2023-10-19 PROCEDURE — 80053 COMPREHEN METABOLIC PANEL: CPT | Performed by: STUDENT IN AN ORGANIZED HEALTH CARE EDUCATION/TRAINING PROGRAM

## 2023-10-19 PROCEDURE — 81001 URINALYSIS AUTO W/SCOPE: CPT | Performed by: STUDENT IN AN ORGANIZED HEALTH CARE EDUCATION/TRAINING PROGRAM

## 2023-10-19 PROCEDURE — 99285 EMERGENCY DEPT VISIT HI MDM: CPT | Mod: 25

## 2023-10-19 PROCEDURE — 93005 ELECTROCARDIOGRAM TRACING: CPT | Performed by: STUDENT IN AN ORGANIZED HEALTH CARE EDUCATION/TRAINING PROGRAM

## 2023-10-19 PROCEDURE — 96376 TX/PRO/DX INJ SAME DRUG ADON: CPT

## 2023-10-19 PROCEDURE — 258N000003 HC RX IP 258 OP 636: Performed by: STUDENT IN AN ORGANIZED HEALTH CARE EDUCATION/TRAINING PROGRAM

## 2023-10-19 PROCEDURE — 82962 GLUCOSE BLOOD TEST: CPT

## 2023-10-19 PROCEDURE — 258N000003 HC RX IP 258 OP 636: Performed by: INTERNAL MEDICINE

## 2023-10-19 PROCEDURE — 250N000011 HC RX IP 250 OP 636: Mod: JZ | Performed by: STUDENT IN AN ORGANIZED HEALTH CARE EDUCATION/TRAINING PROGRAM

## 2023-10-19 PROCEDURE — 120N000001 HC R&B MED SURG/OB

## 2023-10-19 PROCEDURE — 83605 ASSAY OF LACTIC ACID: CPT | Performed by: STUDENT IN AN ORGANIZED HEALTH CARE EDUCATION/TRAINING PROGRAM

## 2023-10-19 PROCEDURE — 36415 COLL VENOUS BLD VENIPUNCTURE: CPT | Performed by: STUDENT IN AN ORGANIZED HEALTH CARE EDUCATION/TRAINING PROGRAM

## 2023-10-19 PROCEDURE — 84484 ASSAY OF TROPONIN QUANT: CPT | Performed by: STUDENT IN AN ORGANIZED HEALTH CARE EDUCATION/TRAINING PROGRAM

## 2023-10-19 PROCEDURE — 87086 URINE CULTURE/COLONY COUNT: CPT | Performed by: STUDENT IN AN ORGANIZED HEALTH CARE EDUCATION/TRAINING PROGRAM

## 2023-10-19 PROCEDURE — 87077 CULTURE AEROBIC IDENTIFY: CPT | Performed by: STUDENT IN AN ORGANIZED HEALTH CARE EDUCATION/TRAINING PROGRAM

## 2023-10-19 PROCEDURE — 84145 PROCALCITONIN (PCT): CPT | Performed by: STUDENT IN AN ORGANIZED HEALTH CARE EDUCATION/TRAINING PROGRAM

## 2023-10-19 PROCEDURE — 83690 ASSAY OF LIPASE: CPT | Performed by: STUDENT IN AN ORGANIZED HEALTH CARE EDUCATION/TRAINING PROGRAM

## 2023-10-19 PROCEDURE — 96365 THER/PROPH/DIAG IV INF INIT: CPT | Mod: 59

## 2023-10-19 PROCEDURE — 83036 HEMOGLOBIN GLYCOSYLATED A1C: CPT | Performed by: INTERNAL MEDICINE

## 2023-10-19 PROCEDURE — 87149 DNA/RNA DIRECT PROBE: CPT | Performed by: STUDENT IN AN ORGANIZED HEALTH CARE EDUCATION/TRAINING PROGRAM

## 2023-10-19 PROCEDURE — 99223 1ST HOSP IP/OBS HIGH 75: CPT | Performed by: INTERNAL MEDICINE

## 2023-10-19 PROCEDURE — 85025 COMPLETE CBC W/AUTO DIFF WBC: CPT | Performed by: STUDENT IN AN ORGANIZED HEALTH CARE EDUCATION/TRAINING PROGRAM

## 2023-10-19 PROCEDURE — 74177 CT ABD & PELVIS W/CONTRAST: CPT

## 2023-10-19 RX ORDER — CEFTRIAXONE 1 G/1
1 INJECTION, POWDER, FOR SOLUTION INTRAMUSCULAR; INTRAVENOUS ONCE
Status: COMPLETED | OUTPATIENT
Start: 2023-10-19 | End: 2023-10-19

## 2023-10-19 RX ORDER — AMOXICILLIN 250 MG
1 CAPSULE ORAL 2 TIMES DAILY PRN
Status: DISCONTINUED | OUTPATIENT
Start: 2023-10-19 | End: 2023-10-21

## 2023-10-19 RX ORDER — POLYETHYLENE GLYCOL 3350 17 G/17G
17 POWDER, FOR SOLUTION ORAL DAILY PRN
Status: DISCONTINUED | OUTPATIENT
Start: 2023-10-19 | End: 2023-10-23 | Stop reason: HOSPADM

## 2023-10-19 RX ORDER — ATORVASTATIN CALCIUM 10 MG/1
20 TABLET, FILM COATED ORAL EVERY EVENING
Status: DISCONTINUED | OUTPATIENT
Start: 2023-10-19 | End: 2023-10-23 | Stop reason: HOSPADM

## 2023-10-19 RX ORDER — ACETAMINOPHEN 325 MG/1
650 TABLET ORAL EVERY 4 HOURS PRN
Status: DISCONTINUED | OUTPATIENT
Start: 2023-10-19 | End: 2023-10-22

## 2023-10-19 RX ORDER — HYDROMORPHONE HYDROCHLORIDE 1 MG/ML
0.3 INJECTION, SOLUTION INTRAMUSCULAR; INTRAVENOUS; SUBCUTANEOUS
Status: DISCONTINUED | OUTPATIENT
Start: 2023-10-19 | End: 2023-10-23 | Stop reason: HOSPADM

## 2023-10-19 RX ORDER — IOPAMIDOL 755 MG/ML
85 INJECTION, SOLUTION INTRAVASCULAR ONCE
Status: COMPLETED | OUTPATIENT
Start: 2023-10-19 | End: 2023-10-19

## 2023-10-19 RX ORDER — SODIUM CHLORIDE 9 MG/ML
INJECTION, SOLUTION INTRAVENOUS CONTINUOUS
Status: DISCONTINUED | OUTPATIENT
Start: 2023-10-19 | End: 2023-10-22

## 2023-10-19 RX ORDER — CIPROFLOXACIN 500 MG/1
500 TABLET, FILM COATED ORAL ONCE
Status: DISCONTINUED | OUTPATIENT
Start: 2023-10-19 | End: 2023-10-19

## 2023-10-19 RX ORDER — ONDANSETRON 2 MG/ML
4 INJECTION INTRAMUSCULAR; INTRAVENOUS EVERY 6 HOURS PRN
Status: DISCONTINUED | OUTPATIENT
Start: 2023-10-19 | End: 2023-10-23 | Stop reason: HOSPADM

## 2023-10-19 RX ORDER — ACETAMINOPHEN 650 MG/1
650 SUPPOSITORY RECTAL EVERY 6 HOURS PRN
Status: DISCONTINUED | OUTPATIENT
Start: 2023-10-19 | End: 2023-10-19

## 2023-10-19 RX ORDER — IBUPROFEN 400 MG/1
400 TABLET, FILM COATED ORAL 3 TIMES DAILY PRN
Status: DISCONTINUED | OUTPATIENT
Start: 2023-10-20 | End: 2023-10-22

## 2023-10-19 RX ORDER — PANTOPRAZOLE SODIUM 40 MG/1
40 TABLET, DELAYED RELEASE ORAL 2 TIMES DAILY
Status: DISCONTINUED | OUTPATIENT
Start: 2023-10-19 | End: 2023-10-23 | Stop reason: HOSPADM

## 2023-10-19 RX ORDER — OXYBUTYNIN CHLORIDE 5 MG/1
5 TABLET ORAL 3 TIMES DAILY
Status: DISCONTINUED | OUTPATIENT
Start: 2023-10-19 | End: 2023-10-23 | Stop reason: HOSPADM

## 2023-10-19 RX ORDER — GLIPIZIDE 5 MG/1
5 TABLET ORAL
Status: DISCONTINUED | OUTPATIENT
Start: 2023-10-20 | End: 2023-10-19

## 2023-10-19 RX ORDER — OXYCODONE HYDROCHLORIDE 5 MG/1
5 TABLET ORAL EVERY 4 HOURS PRN
Status: DISCONTINUED | OUTPATIENT
Start: 2023-10-19 | End: 2023-10-21

## 2023-10-19 RX ORDER — AMOXICILLIN 250 MG
2 CAPSULE ORAL 2 TIMES DAILY PRN
Status: DISCONTINUED | OUTPATIENT
Start: 2023-10-19 | End: 2023-10-21

## 2023-10-19 RX ORDER — ONDANSETRON 2 MG/ML
4 INJECTION INTRAMUSCULAR; INTRAVENOUS ONCE
Status: COMPLETED | OUTPATIENT
Start: 2023-10-19 | End: 2023-10-19

## 2023-10-19 RX ORDER — ATORVASTATIN CALCIUM 10 MG/1
20 TABLET, FILM COATED ORAL DAILY
Status: DISCONTINUED | OUTPATIENT
Start: 2023-10-20 | End: 2023-10-19

## 2023-10-19 RX ORDER — ACETAMINOPHEN 325 MG/1
650 TABLET ORAL EVERY 6 HOURS PRN
Status: DISCONTINUED | OUTPATIENT
Start: 2023-10-19 | End: 2023-10-19

## 2023-10-19 RX ORDER — BISACODYL 10 MG
10 SUPPOSITORY, RECTAL RECTAL DAILY PRN
Status: DISCONTINUED | OUTPATIENT
Start: 2023-10-19 | End: 2023-10-23 | Stop reason: HOSPADM

## 2023-10-19 RX ORDER — ACETAMINOPHEN 650 MG/1
650 SUPPOSITORY RECTAL EVERY 6 HOURS PRN
Status: DISCONTINUED | OUTPATIENT
Start: 2023-10-19 | End: 2023-10-22

## 2023-10-19 RX ORDER — CEFTRIAXONE 1 G/1
1 INJECTION, POWDER, FOR SOLUTION INTRAMUSCULAR; INTRAVENOUS EVERY 24 HOURS
Status: DISCONTINUED | OUTPATIENT
Start: 2023-10-20 | End: 2023-10-21

## 2023-10-19 RX ORDER — ONDANSETRON 4 MG/1
4 TABLET, ORALLY DISINTEGRATING ORAL EVERY 6 HOURS PRN
Status: DISCONTINUED | OUTPATIENT
Start: 2023-10-19 | End: 2023-10-23 | Stop reason: HOSPADM

## 2023-10-19 RX ADMIN — ACETAMINOPHEN 650 MG: 325 TABLET ORAL at 22:49

## 2023-10-19 RX ADMIN — ONDANSETRON 4 MG: 2 INJECTION INTRAMUSCULAR; INTRAVENOUS at 12:18

## 2023-10-19 RX ADMIN — SODIUM CHLORIDE: 9 INJECTION, SOLUTION INTRAVENOUS at 17:01

## 2023-10-19 RX ADMIN — OXYCODONE HYDROCHLORIDE 5 MG: 5 TABLET ORAL at 18:43

## 2023-10-19 RX ADMIN — SODIUM CHLORIDE, POTASSIUM CHLORIDE, SODIUM LACTATE AND CALCIUM CHLORIDE 1000 ML: 600; 310; 30; 20 INJECTION, SOLUTION INTRAVENOUS at 10:45

## 2023-10-19 RX ADMIN — OXYCODONE HYDROCHLORIDE 5 MG: 5 TABLET ORAL at 22:19

## 2023-10-19 RX ADMIN — CEFTRIAXONE 1 G: 1 INJECTION, POWDER, FOR SOLUTION INTRAMUSCULAR; INTRAVENOUS at 15:21

## 2023-10-19 RX ADMIN — ATORVASTATIN CALCIUM 20 MG: 10 TABLET, FILM COATED ORAL at 22:17

## 2023-10-19 RX ADMIN — OXYBUTYNIN CHLORIDE 5 MG: 5 TABLET ORAL at 22:17

## 2023-10-19 RX ADMIN — ONDANSETRON 4 MG: 2 INJECTION INTRAMUSCULAR; INTRAVENOUS at 10:45

## 2023-10-19 RX ADMIN — IOPAMIDOL 85 ML: 755 INJECTION, SOLUTION INTRAVENOUS at 14:21

## 2023-10-19 RX ADMIN — ACETAMINOPHEN 650 MG: 325 TABLET ORAL at 19:00

## 2023-10-19 RX ADMIN — PANTOPRAZOLE SODIUM 40 MG: 40 TABLET, DELAYED RELEASE ORAL at 22:17

## 2023-10-19 ASSESSMENT — ACTIVITIES OF DAILY LIVING (ADL)
ADLS_ACUITY_SCORE: 35
ADLS_ACUITY_SCORE: 37
ADLS_ACUITY_SCORE: 37
ADLS_ACUITY_SCORE: 35

## 2023-10-19 NOTE — H&P
[facility]    History and Physical  Hospitalist       Date of Admission:  10/19/2023    Assessment & Plan   83 year old male with past medical hx of hypertension, hyperlipidemia, diverticulosis, GERD, DM 2 who presents with vomiting, foul smelling urine, and fatigue.    Summary:    Principal Problem:    Sepsis (H) -- UTI, possible prostatitis     -- IV fluids      Urinary tract infection without hematuria, site unspecified   -- 118 WBC in urine, given ceftriaxone, urine culture pending   -- Ditropan 5 mg tid for bladder spasms       Type 2 diabetes mellitus (H)   -- will check Hgb A1C   -- Novolog per corrective dose      Nausea with vomiting   -- zofran as needed      Leukocytosis      Generalized muscle weakness      Plan: antibiotics and IV fluids, further monitoring given borderline sepsis, fatigue, and difficulty tolerating p.o. Urine culture pending. Will place order for post void residual.    DVT Prophylaxis: Pneumatic Compression Devices  Code Status: Full Code    Disposition: Expected discharge in 2 days once fatigue improves and able to tolerate p.o..    Mariusz Camacho MD  Pager: [phone]  Cell Phone:  [phone]     Primary Care Physician   ONUR OATES    Chief Complaint   Vomiting and foul smelling urine    History is obtained from Patient    History of Present Illness   83 year old male who presents with 3 episodes of vomiting, foul smelling urine, general fatigue for the past 24 hours. Straining in order to urinate, endorses pain at end of urinary stream. Feels like he is emptying bladder completely. Needing to get up once during night to urinate. Otherwise denies hematemesis, flank pain, abdominal pain, chills shortness of breath, chest pain or any other complaints. Does notice pain with urination -- ?bladder spasm.     Temp 99.6, RR 39,  bpm, O2 91% on RA, WBC 12.1, platelets 128, procal 0.19, 118 WBC in urine. EKG with sinus rhythm.    CT Abdomen  Pelvis:  1.  No abnormalities are seen to explain symptoms.  2.  Specifically, no inflammatory changes, calculi or bowel obstruction.  3.  Extensive colonic diverticulosis and prior sigmoid resection.  4.  Prostate is enlarged.    PAST MEDICAL HISTORY    Past Medical History:   Diagnosis Date    Arthritis     Diabetes (H)     GERD (gastroesophageal reflux disease)     High cholesterol     Hyperlipidemia     Kidney infection         PAST SURGICAL HISTORY    Past Surgical History:   Procedure Laterality Date    ABDOMEN SURGERY      Diverticulitis    APPENDECTOMY      KNEE SURGERY      NEEDLE BIOPSY, PROSTATE N/A 11/15/2021    Procedure: TRANSRECTAL ULTRSOUND GUIDED PROSTATE BIOPSY;  Surgeon: Alfonso Morales MD;  Location: [facility]        Prior to Admission Medications   Prior to Admission Medications   Prescriptions Last Dose Informant Patient Reported? Taking?   atorvastatin (LIPITOR) 20 MG tablet 10/18/2023 at HS  Yes Yes   Sig: Take 20 mg by mouth daily   blood glucose test strips   No No   Sig: [BLOOD GLUCOSE TEST STRIPS] Use 1 each As Directed as needed. Dispense brand per patient's insurance at pharmacy discretion.  ICD Code: E 11.9   glipiZIDE (GLUCOTROL) 5 MG tablet 10/18/2023 at pm  Yes Yes   Sig: Take 5 mg by mouth 2 times daily (before meals)   ibuprofen (ADVIL,MOTRIN) 200 MG tablet   Yes Yes   Sig: [IBUPROFEN (ADVIL,MOTRIN) 200 MG TABLET] Take 400 mg by mouth every 6 (six) hours as needed for pain.          metFORMIN (GLUCOPHAGE) 1000 MG tablet 10/18/2023 at pm  No Yes   Sig: [METFORMIN (GLUCOPHAGE) 1000 MG TABLET] Take 1 tablet (1,000 mg total) by mouth 2 (two) times a day with meals.   omeprazole (PRILOSEC) 40 MG capsule 10/18/2023 at am  Yes Yes   Sig: [OMEPRAZOLE (PRILOSEC) 40 MG CAPSULE] Take 40 mg by mouth daily.   tiZANidine (ZANAFLEX) 4 MG tablet More than a month  Yes Yes   Sig: Take 4 mg by mouth 3 times daily as needed for other (groin strain)      Facility-Administered Medications:  None     Allergies   No Known Allergies    SOCIAL HISTORY    Social History     Social History Narrative    5/21/2018: Currently lives alone and retired from [employer] in [place].      Social History     Tobacco Use    Smoking status: Some Days     Packs/day: 0     Types: Cigarettes    Smokeless tobacco: Never    Tobacco comments:     1 pack per week   Substance Use Topics    Alcohol use: Yes     Comment: Alcoholic Drinks/day: Occasionally    Drug use: Not Currently        FAMILY HISTORY    History reviewed. No pertinent family history.     Review of Systems   The 10 point Review of Systems is negative other than noted in the HPI or here.       PHYSICAL EXAM     Temp: 99.6  F (37.6  C) Temp src: Oral BP: 125/62 Pulse: 103   Resp: (!) 39 SpO2: 91 %      Vital Signs with Ranges  Temp:  [99.6  F (37.6  C)] 99.6  F (37.6  C)  Pulse:  [] 103  Resp:  [18-43] 39  BP: (122-156)/(56-72) 125/62  SpO2:  [91 %-97 %] 91 %  175 lbs 0 oz    Constitutional: Drowsy, cooperative  Eyes: Conjunctiva and pupils examined and normal.  HEENT: Moist mucous membranes, normal dentition.  Respiratory: Clear to auscultation bilaterally, no crackles or wheezing.  Cardiovascular: Regular rate and rhythm, normal S1 and S2, and no murmur noted, no carotid bruits.  No ankle edema.   GI: Soft, non-distended, non-tender, normal bowel sounds. No CVA tenderness.  Lymph/Hematologic: No anterior cervical, supraclavicular or axillary adenopathy.  Skin: No rashes, no cyanosis.   Musculoskeletal: No joint swelling, erythema or tenderness.  Neurologic: arousal to voice, difficulty answering questions without falling asleep. Cranial nerves 2-12 intact, no focal weakness or numbness.  Psychiatric:  No obvious anxiety or depression.    Data   Data reviewed today:  I personally reviewed the EKG tracing showing sinus rhythm and the abdominal CT image(s) showing extensive colonic diverticulosis and prior sigmoid resection. Prostate enlarged  .  Recent Labs   Lab 10/19/23  1319 10/19/23  1031 10/19/23  1025   WBC  --  12.1*  --    HGB  --  14.3  --    MCV  --  94  --    PLT  --  128*  --      --   --    POTASSIUM 4.5  --   --    CHLORIDE 104  --   --    CO2 24  --   --    BUN 11.5  --   --    CR 0.92  --   --    ANIONGAP 12  --   --    NISHI 9.8  --   --    *  --   --    ALBUMIN 4.0  --   --    PROTTOTAL 6.9  --   --    BILITOTAL 0.7  --   --    ALKPHOS 80  --   --    ALT 12  --   --    AST 14  --   --    LIPASE  --   --  17       Imaging:  Recent Results (from the past 24 hour(s))   CT Abdomen Pelvis w Contrast    Narrative    EXAM: CT ABDOMEN PELVIS W CONTRAST  LOCATION: [facility]  DATE: 10/19/2023    INDICATION: lower abd pain  COMPARISON: CT AP 05/12/2023 and older studies  TECHNIQUE: CT scan of the abdomen and pelvis was performed following injection of IV contrast. Multiplanar reformats were obtained. Dose reduction techniques were used.  CONTRAST: 85 mL of Isovue-370    FINDINGS: Respiratory motion.    LOWER CHEST: There is a small hiatal hernia.    HEPATOBILIARY: Few scattered liver cysts, focal fatty infiltration adjacent to the gallbladder fossa and underlying hepatic steatosis again noted. Cysts need no follow-up.    PANCREAS: Mild fatty atrophy.    SPLEEN: Normal.    ADRENAL GLANDS: Normal.    KIDNEYS/BLADDER: Right renal cysts are again noted. These need no follow-up.    BOWEL: No inflammatory changes. Sigmoid staple line. Extensive scattered colonic diverticulosis throughout. Bowel is of normal caliber..    LYMPH NODES: Normal.    VASCULATURE: Moderate atherosclerotic vascular disease. No aneurysm. Vessels are patent.    PELVIC ORGANS: Prostate is enlarged.    MUSCULOSKELETAL: Mild spondylitic changes at L1 L3. There is a tiny umbilical hernia containing only fat. No suspicious lesions.      Impression    IMPRESSION:   1.  No abnormalities are seen to explain symptoms.  2.  Specifically, no inflammatory  changes, calculi or bowel obstruction.  3.  Extensive colonic diverticulosis and prior sigmoid resection.  4.  Prostate is enlarged.

## 2023-10-19 NOTE — TELEPHONE ENCOUNTER
"Patient calling.    Reports history of acid reflux disease, and states that \"it hasn't been this bad before\".    Since last night, he reports dry heaving and vomiting up yellow fluid at times. He also reports a \"compulsion\" to have a BM, but not able to. His last BM was 2 days ago. Patient reports feeling a discomfort in his lower abdomen just before needing to vomit, and also reports feeling bloated.    Disposition: Go to ED/[facility] (or to office with PCP approval). Recommended ED. Patient verbalized understanding and will have someone take him.    Kelly Wise RN on 10/19/2023 at 9:24 AM     Reason for Disposition   MODERATE vomiting (e.g., 3 - 5 times/day) and age > 60 years     3-4 times/day    Additional Information   Negative: Shock suspected (e.g., cold/pale/clammy skin, too weak to stand, low BP, rapid pulse)   Negative: Difficult to awaken or acting confused (e.g., disoriented, slurred speech)   Negative: Sounds like a life-threatening emergency to the triager   Negative: Vomiting red blood or black (coffee ground) material   Negative: Vomiting and hernia is more painful or swollen than usual   Negative: Recent head injury (within 3 days)   Negative: Recent abdominal injury (within 7 days)   Negative: Insulin-dependent diabetes and glucose > 240 mg/dL (13 mmol/L)   Negative: Severe pain in one eye   Negative: SEVERE vomiting (e.g., 6 or more times/day)  (Exception: Patient sounds well, is drinking liquids, does not sound dehydrated, and vomiting has lasted less than 24 hours.)    Protocols used: Vomiting-A-OH    "

## 2023-10-19 NOTE — PHARMACY-ADMISSION MEDICATION HISTORY
"Pharmacist Admission Medication History    Admission medication history is complete. The information provided in this note is only as accurate as the sources available at the time of the update.    Information Source(s): Patient, Clinic records, and CareEverywhere/SureScripts via in-person    Pertinent Information: he stopped taking aspirin 81 mg a \"few months ago\"    Changes made to PTA medication list:  Added: None  Deleted: aspirin, APAP  Changed: None    Medication Affordability:       Allergies reviewed with patient and updates made in EHR: yes    Medication History Completed By: Lux Talley [facility] 10/19/2023 4:38 PM    Prior to Admission medications    Medication Sig Last Dose Taking? Auth Provider Long Term End Date   atorvastatin (LIPITOR) 20 MG tablet Take 20 mg by mouth daily 10/18/2023 at HS Yes Unknown, Entered By History Yes    glipiZIDE (GLUCOTROL) 5 MG tablet Take 5 mg by mouth 2 times daily (before meals) 10/18/2023 at pm Yes Reported, Patient Yes    ibuprofen (ADVIL,MOTRIN) 200 MG tablet [IBUPROFEN (ADVIL,MOTRIN) 200 MG TABLET] Take 400 mg by mouth every 6 (six) hours as needed for pain.         Yes Provider, Historical     metFORMIN (GLUCOPHAGE) 1000 MG tablet [METFORMIN (GLUCOPHAGE) 1000 MG TABLET] Take 1 tablet (1,000 mg total) by mouth 2 (two) times a day with meals. 10/18/2023 at pm Yes Janette Casanova MD Yes    omeprazole (PRILOSEC) 40 MG capsule [OMEPRAZOLE (PRILOSEC) 40 MG CAPSULE] Take 40 mg by mouth daily. 10/18/2023 at am Yes Provider, Historical     blood glucose test strips [BLOOD GLUCOSE TEST STRIPS] Use 1 each As Directed as needed. Dispense brand per patient's insurance at pharmacy discretion.  ICD Code: E 11.9   Janette Casanova MD       "

## 2023-10-19 NOTE — ED TRIAGE NOTES
Patient presents here for evaluation of vomiting that has occurred over the past 8 hour. He has vomited 3 times. No diarrhea, fevers, or chills. He tried evacuating a bowel movement last night and this morning and was not able to pass stool. He notes that his urine has a very strong odor.

## 2023-10-19 NOTE — ED PROVIDER NOTES
Emergency Department Encounter         FINAL IMPRESSION:  UTI, vomiting          ED COURSE AND MEDICAL DECISION MAKING       ED Course as of 10/19/23 1546   Thu Oct 19, 2023   1031 Patient is an 81-year-old male with a history of hypertension, hyperlipidemia, here with vomiting for 24 hours as well as foul-smelling urine for 24 hours.  No chest pain or trouble breathing.  Intermittent lower abdominal discomfort.  On arrival he looks well.  Mildly tachycardic and potentially febrile with an oral temp of 37.6.  Heart and lungs are normal.  Abdomen benign.  Plan for basic labs fluids Zofran urinalysis and reevaluate.   1046 EKG sinus 91 no signs acute ischemia, no inversions no depressions QTc 440   Leukocytosis appreciated.  Patient persistently tachycardic here.  Seems more tired.  Having difficulty tolerating p.o.  CT normal.  Plan for admission for antibiotics and further monitoring.    10:30 AM I met with the patient for the initial interview and physical examination. Discussed plan for treatment and workup in the ED.     3:46 PM I discussed the case with hospitalist, Dr Agosto, who accepts the patient.                   Medical Decision Making    History:  Supplemental history from: Documented in chart, if applicable  External Record(s) reviewed: Documented in chart, if applicable.    Work Up:  Chart documentation includes differential considered and any EKGs or imaging independently interpreted by provider, where specified.  In additional to work up documented, I considered the following work up: Documented in chart, if applicable.    External consultation:  Discussion of management with another provider: Documented in chart, if applicable    Complicating factors:  Care impacted by chronic illness: Diabetes, Hyperlipidemia, Smoking / Nicotine Use, and Other: GERD, s/p appendectomy, diverticulosis  Care affected by social determinants of health: N/A    Disposition considerations: Admit.      Critical Care      Performed by: Norman Hernández or    Authorized by: Norman Hernández  Total critical care time:  minutes  Critical care was necessary to treat or prevent imminent or life-threatening deterioration of the following conditions:   Critical care was time spent personally by me on the following activities: development of treatment plan with patient or surrogate, discussions with consultants, examination of patient, evaluation of patient's response to treatment, obtaining history from patient or surrogate, ordering and performing treatments and interventions, ordering and review of laboratory studies, ordering and review of radiographic studies, re-evaluation of patient's condition and monitoring for potential decompensation.  Critical care time was exclusive of separately billable procedures and treating other patients.'    At the conclusion of the encounter I discussed the results of all the tests and the disposition. The questions were answered. The patient or family acknowledged understanding and was agreeable with the care plan.        MEDICATIONS GIVEN IN THE EMERGENCY DEPARTMENT:  Medications   ondansetron (ZOFRAN) injection 4 mg (4 mg Intravenous $Given 10/19/23 1045)   lactated ringers BOLUS 1,000 mL (0 mLs Intravenous Stopped 10/19/23 1407)   ondansetron (ZOFRAN) injection 4 mg (4 mg Intravenous $Given 10/19/23 1218)   iopamidol (ISOVUE-370) solution 85 mL (85 mLs Intravenous $Given 10/19/23 1421)   cefTRIAXone (ROCEPHIN) 1 g vial to attach to  mL bag for ADULTS or NS 50 mL bag for PEDS (1 g Intravenous $New Bag 10/19/23 1521)       NEW PRESCRIPTIONS STARTED AT TODAY'S ED VISIT:  New Prescriptions    No medications on file       HPI     Patient information obtained from: patient     Use of : N/A     Dwain Andrade is a 83 year old male with a pertinent history of hyperlipidemia, diabetes mellitus, diverticulosis, tobacco use, GERD, s/p appendectomy who presents to this ED via personal vehicle with friend for  "evaluation of vomiting.    Patient reports he has had acid reflux and been vomiting with yellow speutem since last night. Endorses abdominal tenderness with the vomiting. Endorses a history of diverticulitis, but notes this is not the same. He states he has to have a bowel movement, last one was days ago. Patient notes he has a history of of constipations. Denies shortness of breath, chest pain, urinary symptoms, urinary retention.     Patient's friends notes the patient's urine had a foul odor this morning.         MEDICAL HISTORY     Past Medical History:   Diagnosis Date    Arthritis     Diabetes (H)     GERD (gastroesophageal reflux disease)     High cholesterol     Hyperlipidemia     Kidney infection        Past Surgical History:   Procedure Laterality Date    ABDOMEN SURGERY      Diverticulitis    APPENDECTOMY      KNEE SURGERY      NEEDLE BIOPSY, PROSTATE N/A 11/15/2021    Procedure: TRANSRECTAL ULTRSOUND GUIDED PROSTATE BIOPSY;  Surgeon: Alfonso Morales MD;  Location: [facility] OR       Social History     Tobacco Use    Smoking status: Some Days     Packs/day: 0     Types: Cigarettes    Smokeless tobacco: Never    Tobacco comments:     1 pack per week   Substance Use Topics    Alcohol use: Yes     Comment: Alcoholic Drinks/day: Occasionally    Drug use: Not Currently       acetaminophen (TYLENOL) 325 MG tablet  aspirin 81 mg chewable tablet  atorvastatin (LIPITOR) 20 MG tablet  blood glucose test strips  glipiZIDE (GLUCOTROL) 5 MG tablet  ibuprofen (ADVIL,MOTRIN) 200 MG tablet  metFORMIN (GLUCOPHAGE) 1000 MG tablet  omeprazole (PRILOSEC) 40 MG capsule            PHYSICAL EXAM     /62   Pulse 103   Temp 99.6  F (37.6  C) (Oral)   Resp (!) 39   Ht 1.702 m (5' 7\")   Wt 79.4 kg (175 lb)   SpO2 91%   BMI 27.41 kg/m        PHYSICAL EXAM:     General: Patient appears well, nontoxic, comfortable, febrile  HEENT: Moist mucous membranes,  No head trauma.    Cardiovascular: tachycardic normal " rhythm, no extremity edema.  No appreciable murmur.  Respiratory: No signs of respiratory distress, lungs are clear to auscultation bilaterally with no wheezes rhonchi or rales.  Abdominal: Soft, nontender, nondistended, no palpable masses, no guarding, no rebound  Musculoskeletal: Full range of motion of joints, no deformities appreciated.  Neurological: Alert and oriented, grossly neurologically intact.  Psychological: Normal affect and mood.  Integument: No rashes appreciated      RESULTS       Labs Ordered and Resulted from Time of ED Arrival to Time of ED Departure   COMPREHENSIVE METABOLIC PANEL - Abnormal       Result Value    Sodium 140      Potassium 4.5      Carbon Dioxide (CO2) 24      Anion Gap 12      Urea Nitrogen 11.5      Creatinine 0.92      GFR Estimate 83      Calcium 9.8      Chloride 104      Glucose 139 (*)     Alkaline Phosphatase 80      AST 14      ALT 12      Protein Total 6.9      Albumin 4.0      Bilirubin Total 0.7     ROUTINE UA WITH MICROSCOPIC REFLEX TO CULTURE - Abnormal    Color Urine Yellow      Appearance Urine Turbid (*)     Glucose Urine Negative      Bilirubin Urine Negative      Ketones Urine 10 (*)     Specific Gravity Urine 1.016      Blood Urine Negative      pH Urine 6.5      Protein Albumin Urine 10 (*)     Urobilinogen Urine <2.0      Nitrite Urine Positive (*)     Leukocyte Esterase Urine 500 Umer/uL (*)     Bacteria Urine Moderate (*)     Mucus Urine Present (*)     Amorphous Crystals Urine Few (*)     RBC Urine 5 (*)     WBC Urine 118 (*)     Squamous Epithelials Urine 1     CBC WITH PLATELETS AND DIFFERENTIAL - Abnormal    WBC Count 12.1 (*)     RBC Count 4.46      Hemoglobin 14.3      Hematocrit 42.1      MCV 94      MCH 32.1      MCHC 34.0      RDW 13.6      Platelet Count 128 (*)     % Neutrophils 83      % Lymphocytes 5      % Monocytes 10      Mids % (Monos, Eos, Basos)        % Eosinophils 1      % Basophils 0      % Immature Granulocytes 1      NRBCs per 100  WBC 0      Absolute Neutrophils 10.1 (*)     Absolute Lymphocytes 0.6 (*)     Absolute Monocytes 1.2      Mids Abs (Monos, Eos, Basos)        Absolute Eosinophils 0.1      Absolute Basophils 0.1      Absolute Immature Granulocytes 0.1      Absolute NRBCs 0.0     PROCALCITONIN - Abnormal    Procalcitonin 0.19 (*)    LIPASE - Normal    Lipase 17     TROPONIN T, HIGH SENSITIVITY - Normal    Troponin T, High Sensitivity 8     LACTIC ACID WHOLE BLOOD - Normal    Lactic Acid 0.9     RBC AND PLATELET MORPHOLOGY    Platelet Assessment        Value: Automated Count Confirmed. Platelet morphology is normal.    Acanthocytes        Austin Rods        Basophilic Stippling        Bite Cells        Blister Cells        Ariel Cells        Elliptocytes        Hgb C Crystals        Montes-Jolly Bodies        Hypersegmented Neutrophils        Polychromasia        RBC agglutination        RBC Fragments        Reactive Lymphocytes        Rouleaux        Sickle Cells        Smudge Cells        Spherocytes        Stomatocytes        Target Cells        Teardrop Cells        Toxic Neutrophils        RBC Morphology Confirmed RBC Indices     URINE CULTURE   BLOOD CULTURE   BLOOD CULTURE       CT Abdomen Pelvis w Contrast   Final Result   IMPRESSION:    1.  No abnormalities are seen to explain symptoms.   2.  Specifically, no inflammatory changes, calculi or bowel obstruction.   3.  Extensive colonic diverticulosis and prior sigmoid resection.   4.  Prostate is enlarged.            PROCEDURES:  Procedures:  Procedures       I, Jesenia Camacho am serving as a scribe to document services personally performed by Norman Hernández DO, based on my observations and the provider's statements to me.  I, Norman Hernández DO, attest that Jesenia Camacho is acting in a scribe capacity, has observed my performance of the services and has documented them in accordance with my direction.    Norman Hernández DO  Emergency Medicine  [facility] EMERGENCY  DEPARTMENT       Ohl, Norman Raman, DO  10/19/23 1543

## 2023-10-19 NOTE — ED NOTES
Bed: [facility]-30  Expected date:   Expected time:   Means of arrival:   Comments:  Boarding Pt

## 2023-10-20 PROBLEM — R10.32 ABDOMINAL PAIN, LEFT LOWER QUADRANT: Status: RESOLVED | Noted: 2023-05-12 | Resolved: 2023-10-20

## 2023-10-20 PROBLEM — D69.6 THROMBOCYTOPENIA (H): Status: ACTIVE | Noted: 2023-10-20

## 2023-10-20 PROBLEM — R73.9 ACUTE HYPERGLYCEMIA: Status: RESOLVED | Noted: 2018-05-21 | Resolved: 2023-10-20

## 2023-10-20 PROBLEM — G93.41 SEPTIC ENCEPHALOPATHY: Status: ACTIVE | Noted: 2023-10-20

## 2023-10-20 PROBLEM — N40.0 BPH (BENIGN PROSTATIC HYPERPLASIA): Status: ACTIVE | Noted: 2023-10-20

## 2023-10-20 PROBLEM — R78.81 GRAM-NEGATIVE BACTEREMIA: Status: ACTIVE | Noted: 2023-10-20

## 2023-10-20 PROBLEM — N39.0 URINARY TRACT INFECTION WITHOUT HEMATURIA, SITE UNSPECIFIED: Status: RESOLVED | Noted: 2023-10-19 | Resolved: 2023-10-20

## 2023-10-20 PROBLEM — R65.20 SEVERE SEPSIS (H): Status: ACTIVE | Noted: 2023-10-19

## 2023-10-20 LAB
ACINETOBACTER SPECIES: NOT DETECTED
ANION GAP SERPL CALCULATED.3IONS-SCNC: 13 MMOL/L (ref 7–15)
BACTERIA UR CULT: ABNORMAL
BUN SERPL-MCNC: 13.2 MG/DL (ref 8–23)
CALCIUM SERPL-MCNC: 8.6 MG/DL (ref 8.8–10.2)
CHLORIDE SERPL-SCNC: 103 MMOL/L (ref 98–107)
CITROBACTER SPECIES: NOT DETECTED
CREAT SERPL-MCNC: 1.01 MG/DL (ref 0.67–1.17)
CTX-M: NOT DETECTED
DEPRECATED HCO3 PLAS-SCNC: 22 MMOL/L (ref 22–29)
EGFRCR SERPLBLD CKD-EPI 2021: 74 ML/MIN/1.73M2
ENTEROBACTER SPECIES: NOT DETECTED
ERYTHROCYTE [DISTWIDTH] IN BLOOD BY AUTOMATED COUNT: 13.7 % (ref 10–15)
ESCHERICHIA COLI: DETECTED
GLUCOSE BLDC GLUCOMTR-MCNC: 156 MG/DL (ref 70–99)
GLUCOSE BLDC GLUCOMTR-MCNC: 163 MG/DL (ref 70–99)
GLUCOSE BLDC GLUCOMTR-MCNC: 163 MG/DL (ref 70–99)
GLUCOSE BLDC GLUCOMTR-MCNC: 171 MG/DL (ref 70–99)
GLUCOSE SERPL-MCNC: 161 MG/DL (ref 70–99)
HCT VFR BLD AUTO: 37.9 % (ref 40–53)
HGB BLD-MCNC: 12.5 G/DL (ref 13.3–17.7)
IMP: NOT DETECTED
KLEBSIELLA OXYTOCA: NOT DETECTED
KLEBSIELLA PNEUMONIAE: NOT DETECTED
KPC: NOT DETECTED
LACTATE SERPL-SCNC: 1.6 MMOL/L (ref 0.7–2)
MCH RBC QN AUTO: 31.6 PG (ref 26.5–33)
MCHC RBC AUTO-ENTMCNC: 33 G/DL (ref 31.5–36.5)
MCV RBC AUTO: 96 FL (ref 78–100)
NDM: NOT DETECTED
OXA (DETECTED/NOT DETECTED): NOT DETECTED
PLATELET # BLD AUTO: 82 10E3/UL (ref 150–450)
POTASSIUM SERPL-SCNC: 4.4 MMOL/L (ref 3.4–5.3)
PROTEUS SPECIES: NOT DETECTED
PSEUDOMONAS AERUGINOSA: NOT DETECTED
RBC # BLD AUTO: 3.95 10E6/UL (ref 4.4–5.9)
SODIUM SERPL-SCNC: 138 MMOL/L (ref 135–145)
VIM: NOT DETECTED
WBC # BLD AUTO: 13 10E3/UL (ref 4–11)

## 2023-10-20 PROCEDURE — 250N000011 HC RX IP 250 OP 636: Mod: JZ | Performed by: INTERNAL MEDICINE

## 2023-10-20 PROCEDURE — 85027 COMPLETE CBC AUTOMATED: CPT | Performed by: INTERNAL MEDICINE

## 2023-10-20 PROCEDURE — 82962 GLUCOSE BLOOD TEST: CPT

## 2023-10-20 PROCEDURE — 80048 BASIC METABOLIC PNL TOTAL CA: CPT | Performed by: INTERNAL MEDICINE

## 2023-10-20 PROCEDURE — 83605 ASSAY OF LACTIC ACID: CPT | Performed by: INTERNAL MEDICINE

## 2023-10-20 PROCEDURE — 99233 SBSQ HOSP IP/OBS HIGH 50: CPT | Performed by: INTERNAL MEDICINE

## 2023-10-20 PROCEDURE — 250N000012 HC RX MED GY IP 250 OP 636 PS 637: Performed by: INTERNAL MEDICINE

## 2023-10-20 PROCEDURE — 120N000001 HC R&B MED SURG/OB

## 2023-10-20 PROCEDURE — 250N000013 HC RX MED GY IP 250 OP 250 PS 637: Performed by: INTERNAL MEDICINE

## 2023-10-20 PROCEDURE — 36415 COLL VENOUS BLD VENIPUNCTURE: CPT | Performed by: INTERNAL MEDICINE

## 2023-10-20 PROCEDURE — 258N000003 HC RX IP 258 OP 636: Performed by: INTERNAL MEDICINE

## 2023-10-20 RX ORDER — NALOXONE HYDROCHLORIDE 0.4 MG/ML
0.4 INJECTION, SOLUTION INTRAMUSCULAR; INTRAVENOUS; SUBCUTANEOUS
Status: DISCONTINUED | OUTPATIENT
Start: 2023-10-20 | End: 2023-10-23 | Stop reason: HOSPADM

## 2023-10-20 RX ORDER — LIDOCAINE 50 MG/G
OINTMENT TOPICAL EVERY 4 HOURS PRN
Status: DISCONTINUED | OUTPATIENT
Start: 2023-10-20 | End: 2023-10-20

## 2023-10-20 RX ORDER — NALOXONE HYDROCHLORIDE 0.4 MG/ML
0.2 INJECTION, SOLUTION INTRAMUSCULAR; INTRAVENOUS; SUBCUTANEOUS
Status: DISCONTINUED | OUTPATIENT
Start: 2023-10-20 | End: 2023-10-23 | Stop reason: HOSPADM

## 2023-10-20 RX ORDER — PHENAZOPYRIDINE HYDROCHLORIDE 100 MG/1
100 TABLET, FILM COATED ORAL
Status: DISCONTINUED | OUTPATIENT
Start: 2023-10-20 | End: 2023-10-22

## 2023-10-20 RX ORDER — TAMSULOSIN HYDROCHLORIDE 0.4 MG/1
0.4 CAPSULE ORAL DAILY
Status: DISCONTINUED | OUTPATIENT
Start: 2023-10-20 | End: 2023-10-23 | Stop reason: HOSPADM

## 2023-10-20 RX ADMIN — INSULIN ASPART 1 UNITS: 100 INJECTION, SOLUTION INTRAVENOUS; SUBCUTANEOUS at 16:27

## 2023-10-20 RX ADMIN — ATORVASTATIN CALCIUM 20 MG: 10 TABLET, FILM COATED ORAL at 19:09

## 2023-10-20 RX ADMIN — ONDANSETRON 4 MG: 2 INJECTION INTRAMUSCULAR; INTRAVENOUS at 08:18

## 2023-10-20 RX ADMIN — ONDANSETRON 4 MG: 2 INJECTION INTRAMUSCULAR; INTRAVENOUS at 13:54

## 2023-10-20 RX ADMIN — INSULIN ASPART 2 UNITS: 100 INJECTION, SOLUTION INTRAVENOUS; SUBCUTANEOUS at 12:56

## 2023-10-20 RX ADMIN — PHENAZOPYRIDINE 100 MG: 100 TABLET ORAL at 12:56

## 2023-10-20 RX ADMIN — CEFTRIAXONE SODIUM 1 G: 1 INJECTION, POWDER, FOR SOLUTION INTRAMUSCULAR; INTRAVENOUS at 16:21

## 2023-10-20 RX ADMIN — OXYCODONE HYDROCHLORIDE 5 MG: 5 TABLET ORAL at 13:43

## 2023-10-20 RX ADMIN — HYDROMORPHONE HYDROCHLORIDE 0.3 MG: 1 INJECTION, SOLUTION INTRAMUSCULAR; INTRAVENOUS; SUBCUTANEOUS at 07:07

## 2023-10-20 RX ADMIN — ACETAMINOPHEN 650 MG: 325 TABLET ORAL at 04:04

## 2023-10-20 RX ADMIN — OXYCODONE HYDROCHLORIDE 5 MG: 5 TABLET ORAL at 04:04

## 2023-10-20 RX ADMIN — ACETAMINOPHEN 650 MG: 325 TABLET ORAL at 17:48

## 2023-10-20 RX ADMIN — SODIUM CHLORIDE 500 ML: 9 INJECTION, SOLUTION INTRAVENOUS at 17:46

## 2023-10-20 RX ADMIN — PANTOPRAZOLE SODIUM 40 MG: 40 TABLET, DELAYED RELEASE ORAL at 19:09

## 2023-10-20 RX ADMIN — PANTOPRAZOLE SODIUM 40 MG: 40 TABLET, DELAYED RELEASE ORAL at 08:12

## 2023-10-20 RX ADMIN — PHENAZOPYRIDINE 100 MG: 100 TABLET ORAL at 16:29

## 2023-10-20 RX ADMIN — OXYBUTYNIN CHLORIDE 5 MG: 5 TABLET ORAL at 13:43

## 2023-10-20 RX ADMIN — OXYBUTYNIN CHLORIDE 5 MG: 5 TABLET ORAL at 19:09

## 2023-10-20 RX ADMIN — OXYCODONE HYDROCHLORIDE 5 MG: 5 TABLET ORAL at 09:30

## 2023-10-20 RX ADMIN — OXYBUTYNIN CHLORIDE 5 MG: 5 TABLET ORAL at 08:12

## 2023-10-20 RX ADMIN — TAMSULOSIN HYDROCHLORIDE 0.4 MG: 0.4 CAPSULE ORAL at 09:30

## 2023-10-20 RX ADMIN — INSULIN ASPART 1 UNITS: 100 INJECTION, SOLUTION INTRAVENOUS; SUBCUTANEOUS at 08:11

## 2023-10-20 ASSESSMENT — ACTIVITIES OF DAILY LIVING (ADL)
ADLS_ACUITY_SCORE: 37
ADLS_ACUITY_SCORE: 28
ADLS_ACUITY_SCORE: 37
ADLS_ACUITY_SCORE: 28
ADLS_ACUITY_SCORE: 37
ADLS_ACUITY_SCORE: 32
ADLS_ACUITY_SCORE: 37
ADLS_ACUITY_SCORE: 37
ADLS_ACUITY_SCORE: 28
ADLS_ACUITY_SCORE: 32
ADLS_ACUITY_SCORE: 37
ADLS_ACUITY_SCORE: 28

## 2023-10-20 NOTE — PROGRESS NOTES
Md notified by [facility] about temperature, as needed tylenol given.   Pt. Has significant pain/burning with urination.     Mamie Sanchez RN

## 2023-10-20 NOTE — PLAN OF CARE
Problem: Adult Inpatient Plan of Care  Goal: Optimal Comfort and Wellbeing  Outcome: Progressing     Problem: Pain Acute  Goal: Optimal Pain Control and Function  Outcome: Progressing  Intervention: Prevent or Manage Pain  Recent Flowsheet Documentation  Taken 10/19/2023 1830 by Abdirahman Wild RN  Sensory Stimulation Regulation: auditory stimulation minimized  Medication Review/Management: medications reviewed     Problem: Urinary Elimination Management  Goal: Effective Urinary Elimination/Continence  Outcome: Progressing     Problem: Risk for Delirium  Goal: Improved Behavioral Control  Outcome: Adequate for Care Transition  Intervention: Minimize Safety Risk  Recent Flowsheet Documentation  Taken 10/19/2023 1830 by Abdirahman Wild RN  Communication Enhancement Strategies:   call light answered in person   extra time allowed for response  Enhanced Safety Measures: room near unit station  Goal: Improved Attention and Thought Clarity  Outcome: Adequate for Care Transition  Intervention: Maximize Cognitive Function  Recent Flowsheet Documentation  Taken 10/19/2023 1830 by Abdirahman Wild RN  Sensory Stimulation Regulation: auditory stimulation minimized  Reorientation Measures: clock in view     Problem: Adult Inpatient Plan of Care  Goal: Absence of Hospital-Acquired Illness or Injury  Intervention: Identify and Manage Fall Risk  Recent Flowsheet Documentation  Taken 10/19/2023 1830 by Abdirahman Wild RN  Safety Promotion/Fall Prevention:   room door open   room near nurse's station   assistive device/personal items within reach   clutter free environment maintained   lighting adjusted   room organization consistent   safety round/check completed   supervised activity  Intervention: Prevent Skin Injury  Recent Flowsheet Documentation  Taken 10/19/2023 1830 by Abdirahman Wild RN  Body Position: supine, head elevated  Device Skin Pressure Protection: tubing/devices free from skin contact  Intervention: Prevent  and Manage VTE (Venous Thromboembolism) Risk  Recent Flowsheet Documentation  Taken 10/19/2023 1830 by Abdirahman Wild, RN  VTE Prevention/Management: SCDs (sequential compression devices) off  Intervention: Prevent Infection  Recent Flowsheet Documentation  Taken 10/19/2023 1830 by Abdirahman Wild, RN  Infection Prevention: rest/sleep promoted   Goal Outcome Evaluation:       Patient A/O x4. Patient has fever of 101.3, other VSS. Patient has c/o significant urethral pain for which oxycodone 5mg PO given. Patient having trouble passing urine, only voiding about 50ml at a time. Urine malodorous.     Patient sleeping intermittently.

## 2023-10-20 NOTE — PLAN OF CARE
Goal Outcome Evaluation:       Patient arrived to P1 at 1030 via the ED with a UTI and sepsis.  On arrival Vss, complains of pain with urination.  Had one emesis while eating soup for lunch.  Gave Zofran with good results.  Using urinal at bedside.  Call light within reach, unsure if he understands how to use it, therefore frequent checks.

## 2023-10-20 NOTE — PLAN OF CARE
Goal Outcome Evaluation:      Plan of Care Reviewed With: patient    Overall Patient Progress: no change      Problem: Adult Inpatient Plan of Care  Goal: Optimal Comfort and Wellbeing  Intervention: Monitor Pain and Promote Comfort  Recent Flowsheet Documentation  Taken 10/19/2023 2219 by Waleska Pickard RN  Pain Management Interventions: medication (see MAR)  Patient receiving PO PRN Oxycodone 5 mg for urethral pain with good relief.  When Oxycodone wears off pain is 10/10 and patient moans and becomes restless.      Problem: Urinary Elimination Management  Goal: Effective Urinary Elimination/Continence  Outcome: Not Progressing  Patient continues to have pain with starting urination.  When urinating only goes small amounts at a time.    Fever of 102.5.  Given Tylenol 650 mg.  MD updated.  Tylenol order changed from every 6 hours PRN to every 4 hours PRN.

## 2023-10-20 NOTE — PROGRESS NOTES
[facility]    Hospitalist Progress Note    Assessment & Plan   83 year old male who was admitted on 10/19/2023 for vomiting, foul smelling urine, and fatigue.    Impression:   Active Problems:   Severe sepsis (H) -- UTI, possible prostatitis, possible pyelonephritis   -- IV fluids      UTI (urinary tract infection)    Gram-negative bacteremia   -- Ceftriaxone 2g every 24 hours, blood culture with gram negative bacilli, urine culture positive for E. coli   -- pyridium x2 days for urethral pain   -- oxybutynin for bladder spasms        Type 2 diabetes mellitus (H)   -- Hgb A1C 6.4 10/19/23      Nausea with vomiting   -- Zofran as needed    BPH mild obstuctive symptoms ( ml)   -- started on flomax     Thrombocytopenia (H24)   -- platelets 82, attribute to IV fluid dilution      Leukocytosis    Generalized muscle weakness    Septic encephalopathy    Smoker      Plan:  pyridium for urethral pain x2 days. Blood cultures grew gram negative bacilli and urine culture with E. coli, started on ceftriaxone yesterday, will increase to 2 grams every 24 hours. PVR <250 ml, given dose of Flomax for mild BPH.     DVT Prophylaxis: Pneumatic Compression Devices  Code Status: Full Code    Disposition: Expected discharge in 2-3 days once medically stable and urethral pain is better controlled.    Mariusz Camacho MD  Pager [phone]  Cell Phone [phone]  Text Page (7am to 6pm)  (50 min total)    Interval History   Feels and looks improved. Endorses urethral pain, has been receiving PO oxycodone. Continues to experience bladder spasms and dysuria, will start pyridium for two days. Underwent post void residual <250 ml, will initiate Flomax for mild BPH and IV fluid reduction.     Physical Exam   Temp: 98.2  F (36.8  C) Temp src: Oral BP: 122/64 Pulse: 87   Resp: 18 SpO2: 98 % O2 Device: None (Room air)    Vitals:    10/19/23 0948   Weight: 79.4 kg (175 lb)     Vital Signs with Ranges  Temp:   [98.2  F (36.8  C)-102.5  F (39.2  C)] 98.2  F (36.8  C)  Pulse:  [] 87  Resp:  [18-43] 18  BP: (115-156)/(55-72) 122/64  SpO2:  [90 %-98 %] 98 %  I/O last 3 completed shifts:  In: 370 [P.O.:120; I.V.:250]  Out: 300 [Urine:300]    # Pain Assessment:      10/20/2023    10:34 AM   Current Pain Score   Patient currently in pain? yes   - Dwain is experiencing pain due to UTI, possible pyelonephritis. Pain management was discussed and the plan was created in a collaborative fashion.  Dwain's response to the current recommendations: engaged  - Please see the plan for pain management as documented above      Constitutional: Awake, alert, cooperative, no apparent distress  Respiratory: Clear to auscultation bilaterally, no crackles or wheezing  Cardiovascular: Regular rate and rhythm, normal S1 and S2, and no murmur noted  GI: Normal bowel sounds, soft, non-distended. Left CVA tenderness.   Extrem: No calf tenderness, no ankle edema  Neuro: Ox3, no focal motor or sensory deficits    Medications    sodium chloride 75 mL/hr at 10/20/23 0925      atorvastatin  20 mg Oral QPM    cefTRIAXone  1 g Intravenous Q24H    insulin aspart  1-10 Units Subcutaneous TID AC    insulin aspart  1-7 Units Subcutaneous At Bedtime    oxyBUTYnin  5 mg Oral TID    pantoprazole  40 mg Oral BID    phenazopyridine  100 mg Oral TID w/meals    tamsulosin  0.4 mg Oral Daily       Data   Recent Labs   Lab 10/20/23  1117 10/20/23  0752 10/20/23  0731 10/19/23  2224 10/19/23  1319 10/19/23  1031 10/19/23  1025   0000   WBC  --   --  13.0*  --   --  12.1*  --   --    HGB  --   --  12.5*  --   --  14.3  --   --    MCV  --   --  96  --   --  94  --   --    PLT  --   --  82*  --   --  128*  --   --    NA  --   --  138  --  140  --   --   --    POTASSIUM  --   --  4.4  --  4.5  --   --   --    CHLORIDE  --   --  103  --  104  --   --   --    CO2  --   --  22  --  24  --   --   --    BUN  --   --  13.2  --  11.5  --   --   --    CR  --   --  1.01  --  0.92   --   --   --    ANIONGAP  --   --  13  --  12  --   --   --    NISHI  --   --  8.6*  --  9.8  --   --   --    * 156* 161*   < > 139*  --   --    < >   ALBUMIN  --   --   --   --  4.0  --   --   --    PROTTOTAL  --   --   --   --  6.9  --   --   --    BILITOTAL  --   --   --   --  0.7  --   --   --    ALKPHOS  --   --   --   --  80  --   --   --    ALT  --   --   --   --  12  --   --   --    AST  --   --   --   --  14  --   --   --    LIPASE  --   --   --   --   --   --  17  --     < > = values in this interval not displayed.       Imaging:   Recent Results (from the past 24 hour(s))   CT Abdomen Pelvis w Contrast    Narrative    EXAM: CT ABDOMEN PELVIS W CONTRAST  LOCATION: [facility]  DATE: 10/19/2023    INDICATION: lower abd pain  COMPARISON: CT AP 05/12/2023 and older studies  TECHNIQUE: CT scan of the abdomen and pelvis was performed following injection of IV contrast. Multiplanar reformats were obtained. Dose reduction techniques were used.  CONTRAST: 85 mL of Isovue-370    FINDINGS: Respiratory motion.    LOWER CHEST: There is a small hiatal hernia.    HEPATOBILIARY: Few scattered liver cysts, focal fatty infiltration adjacent to the gallbladder fossa and underlying hepatic steatosis again noted. Cysts need no follow-up.    PANCREAS: Mild fatty atrophy.    SPLEEN: Normal.    ADRENAL GLANDS: Normal.    KIDNEYS/BLADDER: Right renal cysts are again noted. These need no follow-up.    BOWEL: No inflammatory changes. Sigmoid staple line. Extensive scattered colonic diverticulosis throughout. Bowel is of normal caliber..    LYMPH NODES: Normal.    VASCULATURE: Moderate atherosclerotic vascular disease. No aneurysm. Vessels are patent.    PELVIC ORGANS: Prostate is enlarged.    MUSCULOSKELETAL: Mild spondylitic changes at L1 L3. There is a tiny umbilical hernia containing only fat. No suspicious lesions.      Impression    IMPRESSION:   1.  No abnormalities are seen to explain  symptoms.  2.  Specifically, no inflammatory changes, calculi or bowel obstruction.  3.  Extensive colonic diverticulosis and prior sigmoid resection.  4.  Prostate is enlarged.

## 2023-10-21 LAB
ANION GAP SERPL CALCULATED.3IONS-SCNC: 10 MMOL/L (ref 7–15)
BUN SERPL-MCNC: 12.5 MG/DL (ref 8–23)
CALCIUM SERPL-MCNC: 8.6 MG/DL (ref 8.8–10.2)
CHLORIDE SERPL-SCNC: 107 MMOL/L (ref 98–107)
CREAT SERPL-MCNC: 1.04 MG/DL (ref 0.67–1.17)
DEPRECATED HCO3 PLAS-SCNC: 23 MMOL/L (ref 22–29)
EGFRCR SERPLBLD CKD-EPI 2021: 72 ML/MIN/1.73M2
ERYTHROCYTE [DISTWIDTH] IN BLOOD BY AUTOMATED COUNT: 13.6 % (ref 10–15)
GLUCOSE BLDC GLUCOMTR-MCNC: 130 MG/DL (ref 70–99)
GLUCOSE BLDC GLUCOMTR-MCNC: 186 MG/DL (ref 70–99)
GLUCOSE BLDC GLUCOMTR-MCNC: 187 MG/DL (ref 70–99)
GLUCOSE BLDC GLUCOMTR-MCNC: 210 MG/DL (ref 70–99)
GLUCOSE SERPL-MCNC: 126 MG/DL (ref 70–99)
HCT VFR BLD AUTO: 35.6 % (ref 40–53)
HGB BLD-MCNC: 11.6 G/DL (ref 13.3–17.7)
MCH RBC QN AUTO: 31.1 PG (ref 26.5–33)
MCHC RBC AUTO-ENTMCNC: 32.6 G/DL (ref 31.5–36.5)
MCV RBC AUTO: 95 FL (ref 78–100)
PLATELET # BLD AUTO: 73 10E3/UL (ref 150–450)
POTASSIUM SERPL-SCNC: 3.8 MMOL/L (ref 3.4–5.3)
RBC # BLD AUTO: 3.73 10E6/UL (ref 4.4–5.9)
SODIUM SERPL-SCNC: 140 MMOL/L (ref 135–145)
WBC # BLD AUTO: 10.6 10E3/UL (ref 4–11)

## 2023-10-21 PROCEDURE — 36415 COLL VENOUS BLD VENIPUNCTURE: CPT | Performed by: INTERNAL MEDICINE

## 2023-10-21 PROCEDURE — 85027 COMPLETE CBC AUTOMATED: CPT | Performed by: INTERNAL MEDICINE

## 2023-10-21 PROCEDURE — 250N000013 HC RX MED GY IP 250 OP 250 PS 637: Performed by: INTERNAL MEDICINE

## 2023-10-21 PROCEDURE — 250N000011 HC RX IP 250 OP 636: Mod: JZ | Performed by: INTERNAL MEDICINE

## 2023-10-21 PROCEDURE — 120N000001 HC R&B MED SURG/OB

## 2023-10-21 PROCEDURE — 80048 BASIC METABOLIC PNL TOTAL CA: CPT | Performed by: INTERNAL MEDICINE

## 2023-10-21 PROCEDURE — 258N000003 HC RX IP 258 OP 636: Performed by: INTERNAL MEDICINE

## 2023-10-21 PROCEDURE — 99233 SBSQ HOSP IP/OBS HIGH 50: CPT | Performed by: INTERNAL MEDICINE

## 2023-10-21 RX ORDER — SENNOSIDES 8.6 MG
2 TABLET ORAL 2 TIMES DAILY
Status: DISCONTINUED | OUTPATIENT
Start: 2023-10-21 | End: 2023-10-22

## 2023-10-21 RX ORDER — CEFTRIAXONE 2 G/1
2 INJECTION, POWDER, FOR SOLUTION INTRAMUSCULAR; INTRAVENOUS EVERY 24 HOURS
Status: DISCONTINUED | OUTPATIENT
Start: 2023-10-21 | End: 2023-10-23

## 2023-10-21 RX ORDER — AMOXICILLIN 250 MG
2 CAPSULE ORAL 2 TIMES DAILY
Status: DISCONTINUED | OUTPATIENT
Start: 2023-10-21 | End: 2023-10-22

## 2023-10-21 RX ORDER — POLYETHYLENE GLYCOL 3350 17 G/17G
34 POWDER, FOR SOLUTION ORAL ONCE
Status: COMPLETED | OUTPATIENT
Start: 2023-10-21 | End: 2023-10-21

## 2023-10-21 RX ORDER — AMOXICILLIN 250 MG
2 CAPSULE ORAL 2 TIMES DAILY
Status: DISCONTINUED | OUTPATIENT
Start: 2023-10-21 | End: 2023-10-21

## 2023-10-21 RX ADMIN — PHENAZOPYRIDINE 100 MG: 100 TABLET ORAL at 12:46

## 2023-10-21 RX ADMIN — OXYBUTYNIN CHLORIDE 5 MG: 5 TABLET ORAL at 19:58

## 2023-10-21 RX ADMIN — PHENAZOPYRIDINE 100 MG: 100 TABLET ORAL at 08:33

## 2023-10-21 RX ADMIN — ACETAMINOPHEN 650 MG: 325 TABLET ORAL at 04:33

## 2023-10-21 RX ADMIN — TAMSULOSIN HYDROCHLORIDE 0.4 MG: 0.4 CAPSULE ORAL at 08:33

## 2023-10-21 RX ADMIN — INSULIN ASPART 3 UNITS: 100 INJECTION, SOLUTION INTRAVENOUS; SUBCUTANEOUS at 16:10

## 2023-10-21 RX ADMIN — INSULIN ASPART 1 UNITS: 100 INJECTION, SOLUTION INTRAVENOUS; SUBCUTANEOUS at 08:33

## 2023-10-21 RX ADMIN — ACETAMINOPHEN 650 MG: 325 TABLET ORAL at 12:52

## 2023-10-21 RX ADMIN — CEFTRIAXONE SODIUM 2 G: 2 INJECTION, POWDER, FOR SOLUTION INTRAMUSCULAR; INTRAVENOUS at 15:49

## 2023-10-21 RX ADMIN — PANTOPRAZOLE SODIUM 40 MG: 40 TABLET, DELAYED RELEASE ORAL at 19:58

## 2023-10-21 RX ADMIN — PHENAZOPYRIDINE 100 MG: 100 TABLET ORAL at 16:12

## 2023-10-21 RX ADMIN — INSULIN ASPART 2 UNITS: 100 INJECTION, SOLUTION INTRAVENOUS; SUBCUTANEOUS at 12:46

## 2023-10-21 RX ADMIN — ACETAMINOPHEN 650 MG: 325 TABLET ORAL at 17:04

## 2023-10-21 RX ADMIN — SODIUM CHLORIDE: 9 INJECTION, SOLUTION INTRAVENOUS at 08:39

## 2023-10-21 RX ADMIN — ATORVASTATIN CALCIUM 20 MG: 10 TABLET, FILM COATED ORAL at 19:57

## 2023-10-21 RX ADMIN — POLYETHYLENE GLYCOL 3350 34 G: 17 POWDER, FOR SOLUTION ORAL at 13:33

## 2023-10-21 RX ADMIN — IBUPROFEN 400 MG: 400 TABLET, FILM COATED ORAL at 19:58

## 2023-10-21 RX ADMIN — OXYBUTYNIN CHLORIDE 5 MG: 5 TABLET ORAL at 12:53

## 2023-10-21 RX ADMIN — PANTOPRAZOLE SODIUM 40 MG: 40 TABLET, DELAYED RELEASE ORAL at 08:33

## 2023-10-21 RX ADMIN — OXYBUTYNIN CHLORIDE 5 MG: 5 TABLET ORAL at 08:33

## 2023-10-21 ASSESSMENT — ACTIVITIES OF DAILY LIVING (ADL)
ADLS_ACUITY_SCORE: 28
ADLS_ACUITY_SCORE: 32
ADLS_ACUITY_SCORE: 32
DEPENDENT_IADLS:: INDEPENDENT
ADLS_ACUITY_SCORE: 32
ADLS_ACUITY_SCORE: 28
ADLS_ACUITY_SCORE: 32
ADLS_ACUITY_SCORE: 28
ADLS_ACUITY_SCORE: 32
ADLS_ACUITY_SCORE: 28
ADLS_ACUITY_SCORE: 32

## 2023-10-21 NOTE — PLAN OF CARE
Goal Outcome Evaluation:       Patient's pain is managed with ordered pain medication.  Blood sugars covered per orders. Tolerating a diabetic diet.  Continues to complain of pain with urination.  Bed alarm in place, call light within reach.  SBA.

## 2023-10-21 NOTE — PLAN OF CARE
Goal Outcome Evaluation:      Plan of Care Reviewed With: patient    Overall Patient Progress: no changeOverall Patient Progress: no change     At 1600 writer entered patient's room to see patient standing in bathroom doorway holding onto the sliding door and his IV tubing and shaking. Multiple staff helped patient safely back to bed. Patient states he couldn't make it back to bed from the bathroom and thinks he was standing there for around 10 minutes. Patient was very weak, disoriented and having difficulty finishing his sentences. Notified provider and monitored patient. By 1700 patient back to being oriented x4 and was stronger and less shaky. Educated multiple times about call light use. Bed alarm on. Patient reporting bladder spasms rating pain 7-8. Gave PRN acetaminophen and scheduled oxybutynin, somewhat effective per patient. Urine is bright orange. Patient had multiple very large urinary incontinent episodes this shift. Ate less than 25% of dinner. No emesis this shift.

## 2023-10-21 NOTE — PLAN OF CARE
Goal Outcome Evaluation:    Slept well. Up to bathroom 4x noc's- SBA. Reports pain with urination which resolves pretty quickly once back to bed- prn tylenol helpful. IVF infusing.     Temp: 98.6  F (37  C) Temp src: Oral BP: 125/70 Pulse: 94   Resp: 16 SpO2: 94 % O2 Device: None (Room air)

## 2023-10-21 NOTE — PROGRESS NOTES
[facility]    Hospitalist Progress Note    Assessment & Plan   83 year old male who was admitted on 10/19/2023 for vomiting, foul smelling urine, and fatigue.     Impression:   Active Problems:   Severe sepsis (H) -- E. Coli UTI and bacteremia              -- Tachycardia improved with increased IV fluids       UTI (urinary tract infection) -- E. Coli, garcia-sensitive              -- Ceftriaxone 2g every 24 hours, can switch to PO Keflex in 1-2 days then discharge if doing well              -- pyridium x1 day for urethral pain              -- oxybutynin for bladder spasms       Type 2 diabetes mellitus (H)              -- Hgb A1C 6.4 10/19/23       Nausea with vomiting              -- Zofran as needed      BPH mild obstuctive symptoms ( ml)              -- started on flomax      Thrombocytopenia -- suspect 2nd to sepsis       Generalized muscle weakness   -- will get PT eval      Septic encephalopathy -- resolved today      Smoker -- will give info on discharge       Plan:  continue ceftriaxone, oxybutynin, pyridium for another day. Miralax for constipation.     DVT Prophylaxis: Pneumatic Compression Devices  Code Status: Full Code    Disposition: Expected discharge in 1-2 days once medically stable.    Mariusz Camacho MD  Pager [phone]  Cell Phone [phone]  Text Page (7am to 6pm)  (50 min total)    Interval History   Feels well today, continues to experience bladder spasms. Heart rate increased yesterday to 115 bpm, given bolus and continuing IV fluids. Endorses constipation otherwise denies any other complaints.    Physical Exam   Temp: 98.3  F (36.8  C) Temp src: Oral BP: (!) 143/63 Pulse: 82   Resp: 16 SpO2: 94 % O2 Device: None (Room air)    Vitals:    10/19/23 0948   Weight: 79.4 kg (175 lb)     Vital Signs with Ranges  Temp:  [97.3  F (36.3  C)-98.6  F (37  C)] 98.3  F (36.8  C)  Pulse:  [] 82  Resp:  [16-20] 16  BP: (112-143)/(63-70) 143/63  SpO2:  [93 %-95  %] 94 %  I/O last 3 completed shifts:  In: 480 [P.O.:480]  Out: 100 [Urine:100]    # Pain Assessment:      10/21/2023     8:30 AM   Current Pain Score   Patient currently in pain? yes   - Dwain is experiencing pain due to UTI and bladder spasm. Pain management was discussed and the plan was created in a collaborative fashion.  Dwain's response to the current recommendations: engaged  - Please see the plan for pain management as documented above      Constitutional: Awake, alert, cooperative, no apparent distress  Respiratory: Clear to auscultation bilaterally, no crackles or wheezing  Cardiovascular: Regular rate and rhythm, normal S1 and S2, and no murmur noted  GI: High pitch bowel sounds, soft, distended, non-tender  Extrem: No calf tenderness, no ankle edema  Neuro: Ox3, no focal motor or sensory deficits    Medications    sodium chloride 75 mL/hr at 10/21/23 0839      atorvastatin  20 mg Oral QPM    cefTRIAXone  2 g Intravenous Q24H    insulin aspart  1-10 Units Subcutaneous TID AC    insulin aspart  1-7 Units Subcutaneous At Bedtime    oxyBUTYnin  5 mg Oral TID    pantoprazole  40 mg Oral BID    phenazopyridine  100 mg Oral TID w/meals    polyethylene glycol  34 g Oral Once    senna-docusate  2 tablet Oral BID    Or    senna-docusate  2 tablet Oral BID    tamsulosin  0.4 mg Oral Daily       Data   Recent Labs   Lab 10/21/23  1232 10/21/23  0839 10/21/23  0723 10/20/23  0752 10/20/23  0731 10/19/23  2224 10/19/23  1319 10/19/23  1031 10/19/23  1025   0000   WBC  --  10.6  --   --  13.0*  --   --  12.1*  --   --    HGB  --  11.6*  --   --  12.5*  --   --  14.3  --   --    MCV  --  95  --   --  96  --   --  94  --   --    PLT  --  73*  --   --  82*  --   --  128*  --   --    NA  --  140  --   --  138  --  140  --   --   --    POTASSIUM  --  3.8  --   --  4.4  --  4.5  --   --   --    CHLORIDE  --  107  --   --  103  --  104  --   --   --    CO2  --  23  --   --  22  --  24  --   --   --    BUN  --  12.5  --   --   13.2  --  11.5  --   --   --    CR  --  1.04  --   --  1.01  --  0.92  --   --   --    ANIONGAP  --  10  --   --  13  --  12  --   --   --    NISHI  --  8.6*  --   --  8.6*  --  9.8  --   --   --    * 126* 130*   < > 161*   < > 139*  --   --    < >   ALBUMIN  --   --   --   --   --   --  4.0  --   --   --    PROTTOTAL  --   --   --   --   --   --  6.9  --   --   --    BILITOTAL  --   --   --   --   --   --  0.7  --   --   --    ALKPHOS  --   --   --   --   --   --  80  --   --   --    ALT  --   --   --   --   --   --  12  --   --   --    AST  --   --   --   --   --   --  14  --   --   --    LIPASE  --   --   --   --   --   --   --   --  17  --     < > = values in this interval not displayed.       Imaging:   No results found for this or any previous visit (from the past 24 hour(s)).

## 2023-10-22 ENCOUNTER — APPOINTMENT (OUTPATIENT)
Dept: PHYSICAL THERAPY | Facility: HOSPITAL | Age: 83
DRG: 871 | End: 2023-10-22
Payer: COMMERCIAL

## 2023-10-22 LAB
BACTERIA BLD CULT: ABNORMAL
ERYTHROCYTE [DISTWIDTH] IN BLOOD BY AUTOMATED COUNT: 13.6 % (ref 10–15)
GLUCOSE BLDC GLUCOMTR-MCNC: 134 MG/DL (ref 70–99)
GLUCOSE BLDC GLUCOMTR-MCNC: 137 MG/DL (ref 70–99)
GLUCOSE BLDC GLUCOMTR-MCNC: 139 MG/DL (ref 70–99)
GLUCOSE BLDC GLUCOMTR-MCNC: 156 MG/DL (ref 70–99)
HCT VFR BLD AUTO: 36.9 % (ref 40–53)
HGB BLD-MCNC: 12 G/DL (ref 13.3–17.7)
MCH RBC QN AUTO: 30.8 PG (ref 26.5–33)
MCHC RBC AUTO-ENTMCNC: 32.5 G/DL (ref 31.5–36.5)
MCV RBC AUTO: 95 FL (ref 78–100)
PLATELET # BLD AUTO: 85 10E3/UL (ref 150–450)
RBC # BLD AUTO: 3.9 10E6/UL (ref 4.4–5.9)
WBC # BLD AUTO: 7.7 10E3/UL (ref 4–11)

## 2023-10-22 PROCEDURE — 120N000001 HC R&B MED SURG/OB

## 2023-10-22 PROCEDURE — 36415 COLL VENOUS BLD VENIPUNCTURE: CPT | Performed by: INTERNAL MEDICINE

## 2023-10-22 PROCEDURE — 250N000013 HC RX MED GY IP 250 OP 250 PS 637: Performed by: INTERNAL MEDICINE

## 2023-10-22 PROCEDURE — 250N000011 HC RX IP 250 OP 636: Mod: JZ | Performed by: INTERNAL MEDICINE

## 2023-10-22 PROCEDURE — 99232 SBSQ HOSP IP/OBS MODERATE 35: CPT | Performed by: INTERNAL MEDICINE

## 2023-10-22 PROCEDURE — 97110 THERAPEUTIC EXERCISES: CPT | Mod: GP

## 2023-10-22 PROCEDURE — 97116 GAIT TRAINING THERAPY: CPT | Mod: GP

## 2023-10-22 PROCEDURE — 85027 COMPLETE CBC AUTOMATED: CPT | Performed by: INTERNAL MEDICINE

## 2023-10-22 PROCEDURE — 258N000003 HC RX IP 258 OP 636: Performed by: INTERNAL MEDICINE

## 2023-10-22 PROCEDURE — 97161 PT EVAL LOW COMPLEX 20 MIN: CPT | Mod: GP

## 2023-10-22 RX ORDER — ACETAMINOPHEN 325 MG/1
975 TABLET ORAL 3 TIMES DAILY
Status: DISCONTINUED | OUTPATIENT
Start: 2023-10-22 | End: 2023-10-23 | Stop reason: HOSPADM

## 2023-10-22 RX ADMIN — ACETAMINOPHEN 650 MG: 325 TABLET ORAL at 00:09

## 2023-10-22 RX ADMIN — TRAMADOL HYDROCHLORIDE 25 MG: 50 TABLET, COATED ORAL at 16:31

## 2023-10-22 RX ADMIN — PANTOPRAZOLE SODIUM 40 MG: 40 TABLET, DELAYED RELEASE ORAL at 08:54

## 2023-10-22 RX ADMIN — PANTOPRAZOLE SODIUM 40 MG: 40 TABLET, DELAYED RELEASE ORAL at 20:19

## 2023-10-22 RX ADMIN — OXYBUTYNIN CHLORIDE 5 MG: 5 TABLET ORAL at 20:19

## 2023-10-22 RX ADMIN — ACETAMINOPHEN 975 MG: 325 TABLET ORAL at 20:19

## 2023-10-22 RX ADMIN — ACETAMINOPHEN 975 MG: 325 TABLET ORAL at 13:18

## 2023-10-22 RX ADMIN — OXYBUTYNIN CHLORIDE 5 MG: 5 TABLET ORAL at 08:54

## 2023-10-22 RX ADMIN — ATORVASTATIN CALCIUM 20 MG: 10 TABLET, FILM COATED ORAL at 20:19

## 2023-10-22 RX ADMIN — OXYBUTYNIN CHLORIDE 5 MG: 5 TABLET ORAL at 13:18

## 2023-10-22 RX ADMIN — PHENAZOPYRIDINE 100 MG: 100 TABLET ORAL at 08:54

## 2023-10-22 RX ADMIN — TRAMADOL HYDROCHLORIDE 25 MG: 50 TABLET, COATED ORAL at 22:56

## 2023-10-22 RX ADMIN — SODIUM CHLORIDE: 9 INJECTION, SOLUTION INTRAVENOUS at 00:04

## 2023-10-22 RX ADMIN — CEFTRIAXONE SODIUM 2 G: 2 INJECTION, POWDER, FOR SOLUTION INTRAMUSCULAR; INTRAVENOUS at 16:45

## 2023-10-22 RX ADMIN — INSULIN ASPART 1 UNITS: 100 INJECTION, SOLUTION INTRAVENOUS; SUBCUTANEOUS at 13:12

## 2023-10-22 RX ADMIN — TAMSULOSIN HYDROCHLORIDE 0.4 MG: 0.4 CAPSULE ORAL at 08:54

## 2023-10-22 RX ADMIN — IBUPROFEN 400 MG: 400 TABLET, FILM COATED ORAL at 10:46

## 2023-10-22 ASSESSMENT — ACTIVITIES OF DAILY LIVING (ADL)
ADLS_ACUITY_SCORE: 30
ADLS_ACUITY_SCORE: 28
ADLS_ACUITY_SCORE: 30
ADLS_ACUITY_SCORE: 28
ADLS_ACUITY_SCORE: 28
ADLS_ACUITY_SCORE: 30
ADLS_ACUITY_SCORE: 28
ADLS_ACUITY_SCORE: 28

## 2023-10-22 NOTE — PLAN OF CARE
Physical Therapy Discharge Summary    Reason for therapy discharge:    All goals and outcomes met, no further needs identified.    Progress towards therapy goal(s). See goals on Care Plan in [facility] electronic health record for goal details.  Goals met    Therapy recommendation(s):    Recommend amb with nursing staff while remains in hospital .Continue exercises on his own.

## 2023-10-22 NOTE — PLAN OF CARE
Goal Outcome Evaluation:       Patient denies pain this shift.  Blood sugars covered per orders.  Tolerating a diabetic diet.  Bed alarm in place, call light within reach.  SBA.

## 2023-10-22 NOTE — PROGRESS NOTES
10/22/23 1045   Appointment Info   Signing Clinician's Name / Credentials (PT) Monica Lopez PT   Rehab Comments (PT) Patient in bed .Cooperativ with PT .Returned to bed after PT with bed alarm on and call light in reach   Living Environment   People in Home child(shady), adult   Current Living Arrangements house   Home Accessibility stairs to enter home   Number of Stairs, Main Entrance 3   Stair Railings, Main Entrance railing on right side (ascending)   Transportation Anticipated family or friend will provide   Living Environment Comments From [place],visiting family and friends.LIves with Daughter and granddaughter .Daughter works from home   Self-Care   Usual Activity Tolerance good   Current Activity Tolerance good   Equipment Currently Used at Home none   Fall history within last six months no   Activity/Exercise/Self-Care Comment Patient independent with mobility and ADL at baseline   General Information   Onset of Illness/Injury or Date of Surgery 10/19/23   Referring Physician Ritchie Myrick   Patient/Family Therapy Goals Statement (PT) return home   Pertinent History of Current Problem (include personal factors and/or comorbidities that impact the POC) admitted with UTI   Existing Precautions/Restrictions fall   Weight-Bearing Status - LLE weight-bearing as tolerated   Weight-Bearing Status - RLE weight-bearing as tolerated   General Observations Coperative and pleasant   Cognition   Affect/Mental Status (Cognition) WFL   Orientation Status (Cognition) oriented x 4   Pain Assessment   Patient Currently in Pain Yes, see Vital Sign flowsheet   Range of Motion (ROM)   Range of Motion ROM is WFL   Strength (Manual Muscle Testing)   Strength (Manual Muscle Testing) strength is WFL   Bed Mobility   Bed Mobility no deficits identified   Transfers   Transfers sit-stand transfer   Maintains Weight-bearing Status (Transfers) able to maintain   Sit-Stand Transfer   Sit-Stand [place] (Transfers) supervision    Assistive Device (Sit-Stand Transfers) other (see comments)  (none)   Gait/Stairs (Locomotion)   [place] Level (Gait) contact guard   Assistive Device (Gait) other (see comments)  (none)   Distance in Feet (Gait) 75 feet   Pattern (Gait) step-through   Deviations/Abnormal Patterns (Gait) base of support, narrow;stride length decreased   Maintains Weight-bearing Status (Gait) able to maintain   Clinical Impression   Criteria for Skilled Therapeutic Intervention Evaluation only   Clinical Presentation (PT Evaluation Complexity) stable   Clinical Presentation Rationale pt presents as med diagnosed   Clinical Decision Making (Complexity) low complexity   Planned Therapy Interventions (PT) bed mobility training;gait training;stair training;transfer training;home exercise program   Risk & Benefits of therapy have been explained evaluation/treatment results reviewed;patient   Clinical Impression Comments Patient is a 82 yo male admitted with UTI .Presents independent mobility .Has no needs for acute .skilled PT .Recommend amb with nursing staff while remains in hospital.   PT Total Evaluation Time   PT Eval, Low Complexity Minutes (55799) 15   Physical Therapy Goals   PT Frequency One time eval and treatment only   PT Predicted Duration/Target Date for Goal Attainment 10/22/23   PT: Bed Mobility Independent;Supine to/from sit;Goal Met   PT: Transfers Supervision/stand-by assist;Sit to/from stand;Goal Met   PT: Gait Supervision/stand-by assist;Greater than 200 feet;Goal Met   PT: Stairs Modified independent;3 stairs;Rail on right;Goal Met   PT: Goal 1 patient will be independent with HEP with handout=goal met   Therapeutic Procedure/Exercise   Ther. Procedure: strength, endurance, ROM, flexibillity Minutes (40038) 10   Symptoms Noted During/After Treatment none   Treatment Detail/Skilled Intervention standing vance L/E ex x 10 reps   Gait Training   Gait Training Minutes (81818) 12   Symptoms Noted During/After  Treatment (Gait Training) fatigue   Treatment Detail/Skilled Intervention slightly unsteady at times but no loss of balance noted   Distance in Feet 400 feet   [place] Level (Gait Training) contact guard   Physical Assistance Level (Gait Training) supervision;verbal cues  (to slow down)   Weight Bearing (Gait Training) full weight-bearing   Assistive Device (Gait Training) other (see comments)  (none)   Pattern Analysis (Gait Training) swing-through gait   Gait Analysis Deviations decreased step length;decreased stride length;decreased toe-to-floor clearance   Stair Railings present on right side  (3 steps up/down)   Physical Assist/Nonphysical Assist (Stairs) supervision;1 person assist   Level of [place] (Stairs) stand-by assist   PT Discharge Planning   PT Plan d/c PT-goals met   PT Discharge Recommendation (DC Rec) home with assist   PT Rationale for DC Rec moving well ,will stay with friend before going back home   PT Brief overview of current status SBA for mobility . feet with no device ,SBA /CGA   Total Session Time   Timed Code Treatment Minutes 22   Total Session Time (sum of timed and untimed services) 37

## 2023-10-22 NOTE — PROGRESS NOTES
[facility]    Hospitalist Progress Note    Assessment & Plan   83 year old male who was admitted on 10/19/2023 for vomiting, foul smelling urine, and fatigue.       Impression:   Active Problems:   Severe sepsis (H) -- E. Coli UTI and bacteremia              -- Tachycardia improved with increased IV fluids, fluids discontinued.       UTI (urinary tract infection) -- E. Coli, garcia-sensitive              -- Ceftriaxone 2g today, switch to PO Keflex 500 mg tid x 10 days at discharge              -- oxybutynin for bladder spasms       Type 2 diabetes mellitus (H)              -- Hgb A1C 6.4 10/19/23       Nausea with vomiting              -- Zofran as needed      BPH mild obstuctive symptoms ( ml)              -- started on flomax      Thrombocytopenia -- suspect 2nd to sepsis       Generalized muscle weakness              -- will get PT eval       Septic encephalopathy -- resolved        Smoker -- will give info on discharge        Plan:   continue ceftriaxone, oxybutynin today, added oral tramadol prn for bladder spasms. Miralax as needed. Discontinued pyridium. Will discharge tomorrow with Keflex 500 mg tid x 10 days given possible prostatitis. Follow up 10-14 days for UA and UC.    DVT Prophylaxis: Pneumatic Compression Devices  Code Status: Full Code    Disposition: Expected discharge 10/23/23 to friend Janki's home in [place], [place]. Patient agrees.    Ritchie Myrick MD  [facility]    Interval History   Continues to experience bladder spasms and dysuria. Had a formed bowel movement. Feels less confused today, otherwise denies any other complaints.    Physical Exam   Temp: 98.6  F (37  C) Temp src: Oral BP: (!) 176/82 Pulse: 60   Resp: 20 SpO2: 97 % O2 Device: None (Room air)    Vitals:    10/19/23 0948   Weight: 79.4 kg (175 lb)     Vital Signs with Ranges  Temp:  [97.4  F (36.3  C)-98.7  F (37.1  C)] 98.6  F (37  C)  Pulse:  [60-91] 60  Resp:  [18-20] 20  BP:  (115-176)/(66-82) 176/82  SpO2:  [94 %-97 %] 97 %  I/O last 3 completed shifts:  In: 3634 [P.O.:600; I.V.:3034]  Out: 200 [Urine:200]    # Pain Assessment:      10/22/2023    12:49 AM   Current Pain Score   Patient currently in pain? denies   - Dwain is experiencing pain due to UTI and bladder spasm. Pain management was discussed and the plan was created in a collaborative fashion.  Dwain's response to the current recommendations: engaged  - Please see the plan for pain management as documented above      Constitutional: Awake, alert, cooperative, no apparent distress  Respiratory: Clear to auscultation bilaterally, no crackles or wheezing  Cardiovascular: Regular rate and rhythm, normal S1 and S2, and no murmur noted  GI: Normal bowel sounds, soft, distended, non-tender  Extrem: No calf tenderness, no ankle edema  Neuro: Ox3, no focal motor or sensory deficits    Medications    sodium chloride 75 mL/hr at 10/22/23 0004      atorvastatin  20 mg Oral QPM    cefTRIAXone  2 g Intravenous Q24H    insulin aspart  1-10 Units Subcutaneous TID AC    insulin aspart  1-7 Units Subcutaneous At Bedtime    oxyBUTYnin  5 mg Oral TID    pantoprazole  40 mg Oral BID    phenazopyridine  100 mg Oral TID w/meals    senna-docusate  2 tablet Oral BID    sennosides  2 tablet Oral BID    tamsulosin  0.4 mg Oral Daily       Data   Recent Labs   Lab 10/22/23  0843 10/22/23  0735 10/21/23  2148 10/21/23  1609 10/21/23  1232 10/21/23  0839 10/20/23  0752 10/20/23  0731 10/19/23  2224 10/19/23  1319 10/19/23  1031 10/19/23  1025   WBC 7.7  --   --   --   --  10.6  --  13.0*  --   --    < >  --    HGB 12.0*  --   --   --   --  11.6*  --  12.5*  --   --    < >  --    MCV 95  --   --   --   --  95  --  96  --   --    < >  --    PLT 85*  --   --   --   --  73*  --  82*  --   --    < >  --    NA  --   --   --   --   --  140  --  138  --  140  --   --    POTASSIUM  --   --   --   --   --  3.8  --  4.4  --  4.5  --   --    CHLORIDE  --   --   --   --    --  107  --  103  --  104  --   --    CO2  --   --   --   --   --  23  --  22  --  24  --   --    BUN  --   --   --   --   --  12.5  --  13.2  --  11.5  --   --    CR  --   --   --   --   --  1.04  --  1.01  --  0.92  --   --    ANIONGAP  --   --   --   --   --  10  --  13  --  12  --   --    NISHI  --   --   --   --   --  8.6*  --  8.6*  --  9.8  --   --    GLC  --  139* 186* 210*   < > 126*   < > 161*   < > 139*   < >  --    ALBUMIN  --   --   --   --   --   --   --   --   --  4.0  --   --    PROTTOTAL  --   --   --   --   --   --   --   --   --  6.9  --   --    BILITOTAL  --   --   --   --   --   --   --   --   --  0.7  --   --    ALKPHOS  --   --   --   --   --   --   --   --   --  80  --   --    ALT  --   --   --   --   --   --   --   --   --  12  --   --    AST  --   --   --   --   --   --   --   --   --  14  --   --    LIPASE  --   --   --   --   --   --   --   --   --   --   --  17    < > = values in this interval not displayed.       Imaging:   No results found for this or any previous visit (from the past 24 hour(s)).

## 2023-10-22 NOTE — PLAN OF CARE
Goal Outcome Evaluation:    Slept well. Endorses 7/10 when urinating- strains to void. PRN tylenol given. Up x4 noc's to bathroom. Had medium sized formed BM. Alert, oriented x4 but forgetful. SBA to bathroom, mostly steady but has moments he appears unsteady.    IVF infusing.     Temp: 97.4  F (36.3  C) Temp src: Oral BP: (!) 146/70 Pulse: 70   Resp: 20 SpO2: 94 % O2 Device: None (Room air)

## 2023-10-22 NOTE — PLAN OF CARE
"Goal Outcome Evaluation:      Plan of Care Reviewed With: patient    Overall Patient Progress: no changeOverall Patient Progress: no change     Patient complains of burning with urination. States his pain is the worst near his scrotum but he has pain throughout his whole penis when urinating. Patient had multiple loose stools this shift, held senna. Orientation improved from yesterday but still seems confused. Patient was stating the football game had ended with a final score of [place] - 10 and [place] - 3, when previously we had been talking about the game when the scores were reversed. Patient then stated that his \"head is all confused.\" Extremities are moderately weak, no change from yesterday. Patient is steadier than yesterday.  "

## 2023-10-23 VITALS
TEMPERATURE: 98.6 F | OXYGEN SATURATION: 96 % | HEART RATE: 59 BPM | DIASTOLIC BLOOD PRESSURE: 76 MMHG | BODY MASS INDEX: 27.47 KG/M2 | HEIGHT: 67 IN | SYSTOLIC BLOOD PRESSURE: 154 MMHG | RESPIRATION RATE: 18 BRPM | WEIGHT: 175 LBS

## 2023-10-23 LAB — GLUCOSE BLDC GLUCOMTR-MCNC: 141 MG/DL (ref 70–99)

## 2023-10-23 PROCEDURE — 250N000013 HC RX MED GY IP 250 OP 250 PS 637: Performed by: INTERNAL MEDICINE

## 2023-10-23 PROCEDURE — 99239 HOSP IP/OBS DSCHRG MGMT >30: CPT | Performed by: INTERNAL MEDICINE

## 2023-10-23 RX ORDER — CEPHALEXIN 500 MG/1
500 CAPSULE ORAL EVERY 8 HOURS SCHEDULED
Status: DISCONTINUED | OUTPATIENT
Start: 2023-10-23 | End: 2023-10-23 | Stop reason: HOSPADM

## 2023-10-23 RX ORDER — CEPHALEXIN 500 MG/1
500 CAPSULE ORAL EVERY 8 HOURS
Qty: 30 CAPSULE | Refills: 0 | Status: SHIPPED | OUTPATIENT
Start: 2023-10-23 | End: 2023-11-02

## 2023-10-23 RX ADMIN — INSULIN ASPART 1 UNITS: 100 INJECTION, SOLUTION INTRAVENOUS; SUBCUTANEOUS at 08:35

## 2023-10-23 RX ADMIN — PANTOPRAZOLE SODIUM 40 MG: 40 TABLET, DELAYED RELEASE ORAL at 08:33

## 2023-10-23 RX ADMIN — OXYBUTYNIN CHLORIDE 5 MG: 5 TABLET ORAL at 08:33

## 2023-10-23 RX ADMIN — TAMSULOSIN HYDROCHLORIDE 0.4 MG: 0.4 CAPSULE ORAL at 08:32

## 2023-10-23 RX ADMIN — ACETAMINOPHEN 975 MG: 325 TABLET ORAL at 08:32

## 2023-10-23 ASSESSMENT — ACTIVITIES OF DAILY LIVING (ADL)
ADLS_ACUITY_SCORE: 26
ADLS_ACUITY_SCORE: 30
ADLS_ACUITY_SCORE: 30
ADLS_ACUITY_SCORE: 26
ADLS_ACUITY_SCORE: 30

## 2023-10-23 NOTE — PLAN OF CARE
"  Problem: Adult Inpatient Plan of Care  Goal: Patient-Specific Goal (Individualized)  Description: You can add care plan individualizations to a care plan. Examples of Individualization might be:  \"Parent requests to be called daily at 9am for status\", \"I have a hard time hearing out of my right ear\", or \"Do not touch me to wake me up as it startles  me\".  Outcome: Progressing  Goal: Absence of Hospital-Acquired Illness or Injury  Outcome: Progressing  Intervention: Identify and Manage Fall Risk  Recent Flowsheet Documentation  Taken 10/23/2023 0000 by Celine Hartley RN  Safety Promotion/Fall Prevention:   activity supervised   clutter free environment maintained   lighting adjusted   nonskid shoes/slippers when out of bed   patient and family education   safety round/check completed  Taken 10/22/2023 2019 by Celine Hartley RN  Safety Promotion/Fall Prevention:   activity supervised   clutter free environment maintained   lighting adjusted   nonskid shoes/slippers when out of bed   patient and family education   safety round/check completed  Intervention: Prevent Skin Injury  Recent Flowsheet Documentation  Taken 10/23/2023 0000 by Celine Hartley RN  Body Position: position changed independently  Skin Protection: incontinence pads utilized  Taken 10/22/2023 2350 by Celine Hartley RN  Body Position: position changed independently  Taken 10/22/2023 2019 by Celine Hartley RN  Body Position: position changed independently  Skin Protection: incontinence pads utilized  Intervention: Prevent Infection  Recent Flowsheet Documentation  Taken 10/23/2023 0000 by Celine Hartley RN  Infection Prevention: rest/sleep promoted  Taken 10/22/2023 2019 by Celine Hartley RN  Infection Prevention: rest/sleep promoted  Goal: Optimal Comfort and Wellbeing  Outcome: Progressing  Intervention: Monitor Pain and Promote Comfort  Recent Flowsheet Documentation  Taken 10/22/2023 2256 by Celine Hartley RN  Pain Management Interventions: medication (see " MAR)  Taken 10/22/2023 2019 by Celine Hartley RN  Pain Management Interventions: medication (see MAR)     Problem: Risk for Delirium  Goal: Optimal Coping  Outcome: Progressing  Goal: Improved Sleep  Outcome: Progressing     Problem: Pain Acute  Goal: Optimal Pain Control and Function  Outcome: Progressing  Intervention: Develop Pain Management Plan  Recent Flowsheet Documentation  Taken 10/22/2023 2256 by Celine Hartley RN  Pain Management Interventions: medication (see MAR)  Taken 10/22/2023 2019 by Celine Hartley RN  Pain Management Interventions: medication (see MAR)  Intervention: Prevent or Manage Pain  Recent Flowsheet Documentation  Taken 10/23/2023 0000 by Celine Hartley RN  Sensory Stimulation Regulation: quiet environment promoted  Medication Review/Management: medications reviewed  Taken 10/22/2023 2019 by Celine Hartley RN  Sensory Stimulation Regulation: quiet environment promoted  Medication Review/Management: medications reviewed     Problem: Urinary Elimination Management  Goal: Effective Urinary Elimination/Continence  Outcome: Progressing     Pt's pain is managed with tramadol. Denies nausea. Slept throughout the night. Assist of 1 with walker and belt. Can be continent and incontinent of B&B. Also uses urinal at bedside if needed.

## 2023-10-23 NOTE — CONSULTS
Care Management Discharge Note    Discharge Date: 10/23/2023       Discharge Disposition:  Home with family to transport    Discharge Services:  none recommended at this time    Discharge DME:      Discharge Transportation: family or friend will provide    Private pay costs discussed: Not applicable    Does the patient's insurance plan have a 3 day qualifying hospital stay waiver?  No    PAS Confirmation Code:  NA  Patient/family educated on Medicare website which has current facility and service quality ratings:  NA    Education Provided on the Discharge Plan:  yes  Persons Notified of Discharge Plans: nursing, pt  Patient/Family in Agreement with the Plan:  yes    Handoff Referral Completed: Yes    Additional Information:  SW met with pt to discuss discharge and SW consult for missing adult daughter. Pt states he moved to [place] about 6 months ago, and comes and goes between [place] and [place] to see his family and grandchildren. Daughter has been missing for 2 month, with a history of chemical dependency. Pt wants to provide as much support as he can to his adult grandchildren. SW does not have resources to assist pt in finding his daughter, he reported that a friend has gone around with pictures asking if she has been seen. Pt states he is independent and able to fly between the states as he is retired. Family to transport at discharge.     BRIAN Sprague

## 2023-10-23 NOTE — PROGRESS NOTES
Patient given discharge instructions and paperwork, pt verbalized understanding of discharge instructions. Patient given morning meds and belongings returned at discharge. Patient discharging to home.

## 2023-10-23 NOTE — DISCHARGE SUMMARY
"[facility]  Hospitalist Discharge Summary      Date of Admission:  10/19/2023  Date of Discharge:  10/23/2023 10:27 AM  Discharging Provider: Ritchie Myrick MD  Discharge Service: Hospitalist Service    Discharge Diagnoses   Assessment & Plan   83 year old male who was admitted on 10/19/2023 for vomiting, foul smelling urine, and fatigue.        Impression:   Active Problems:   Severe sepsis (H) -- E. Coli UTI and bacteremia              -- Tachycardia resolved with increased IV fluids, fluids discontinued.       UTI (urinary tract infection) -- E. Coli, garcia-sensitive              -- Ceftriaxone 2g IV and then switch to PO Keflex 500 mg tid x 10 days at discharge              -- longer course due to concern for prostatitis       Type 2 diabetes mellitus (H)              -- Hgb A1C 6.4 10/19/23       Nausea with vomiting              -- Zofran as needed      BPH mild obstuctive symptoms ( ml)              -- started on flomax      Thrombocytopenia -- suspect 2nd to sepsis       Generalized muscle weakness              -- will get PT eval       Septic encephalopathy -- resolved        Smoker -- stopped months ago     Clinically Significant Risk Factors     # Overweight: Estimated body mass index is 27.41 kg/m  as calculated from the following:    Height as of this encounter: 1.702 m (5' 7\").    Weight as of this encounter: 79.4 kg (175 lb).       Follow-ups Needed After Discharge   Follow-up Appointments     Follow-up and recommended labs and tests       Follow up with primary care provider, Aramis Burris, within 7 days for   hospital follow- up.  The following labs/tests are recommended: blood   sugar check.          Discharge Disposition   Discharged to home  Condition at discharge: Stable    Consultations This Hospital Stay   CARE MANAGEMENT / SOCIAL WORK IP CONSULT  PHYSICAL THERAPY ADULT IP CONSULT  CARE MANAGEMENT / SOCIAL WORK IP CONSULT    Code Status   Prior    Time Spent on this " Encounter   I, Ritchie Myrick MD, personally saw the patient today and spent greater than 30 minutes discharging this patient.       Ritchie Myrick MD  [address] [address]  Phone: [phone]  Fax: [phone]  ______________________________________________________________________    Physical Exam   Vital Signs: Temp: 98.6  F (37  C) Temp src: Oral BP: (!) 154/76 Pulse: 59   Resp: 18 SpO2: 96 % O2 Device: None (Room air)    Weight: 175 lbs 0 oz  Constitutional: awake, alert, cooperative, no apparent distress, and appears stated age  Eyes: pupils equal, round and reactive to light and sclera clear  Respiratory: No increased work of breathing, good air exchange, clear to auscultation bilaterally, no crackles or wheezing  Cardiovascular: Normal apical impulse, regular rate and rhythm, normal S1 and S2, no S3 or S4, and no murmur noted  GI: No scars, normal bowel sounds, soft, non-distended, non-tender, no masses palpated, no hepatosplenomegally  Neurologic: Mental Status Exam:  Level of Alertness:   awake  Orientation:   person, place, time  Memory:   normal  Motor Exam:  moves all extremities well and symmetrically       Primary Care Physician   Aramis Burris    Discharge Orders      Reason for your hospital stay    UTI with sepsis, bladder spasms     Follow-up and recommended labs and tests     Follow up with primary care provider, Aramis Burris, within 7 days for hospital follow- up.  The following labs/tests are recommended: blood sugar check.     Activity    Your activity upon discharge: activity as tolerated     Diet    Follow this diet upon discharge: Orders Placed This Encounter      Combination Diet Moderate Consistent Carb (60 g CHO per Meal) Diet       Significant Results and Procedures   Most Recent 3 CBC's:  Recent Labs   Lab Test 10/22/23  0843 10/21/23  0839 10/20/23  0731   WBC 7.7 10.6 13.0*   HGB 12.0* 11.6* 12.5*   MCV 95 95 96   PLT 85* 73*  82*     Most Recent 3 BMP's:  Recent Labs   Lab Test 10/23/23  0833 10/22/23  2057 10/22/23  1651 10/21/23  1232 10/21/23  0839 10/20/23  0752 10/20/23  0731 10/19/23  2224 10/19/23  1319   NA  --   --   --   --  140  --  138  --  140   POTASSIUM  --   --   --   --  3.8  --  4.4  --  4.5   CHLORIDE  --   --   --   --  107  --  103  --  104   CO2  --   --   --   --  23  --  22  --  24   BUN  --   --   --   --  12.5  --  13.2  --  11.5   CR  --   --   --   --  1.04  --  1.01  --  0.92   ANIONGAP  --   --   --   --  10  --  13  --  12   NISHI  --   --   --   --  8.6*  --  8.6*  --  9.8   * 134* 137*   < > 126*   < > 161*   < > 139*    < > = values in this interval not displayed.     Most Recent 2 LFT's:  Recent Labs   Lab Test 10/19/23  1319 08/26/19  1736   AST 14 14   ALT 12 13   ALKPHOS 80 65   BILITOTAL 0.7 0.4   ,   Results for orders placed or performed during the hospital encounter of 10/19/23   CT Abdomen Pelvis w Contrast    Narrative    EXAM: CT ABDOMEN PELVIS W CONTRAST  LOCATION: [facility]  DATE: 10/19/2023    INDICATION: lower abd pain  COMPARISON: CT AP 05/12/2023 and older studies  TECHNIQUE: CT scan of the abdomen and pelvis was performed following injection of IV contrast. Multiplanar reformats were obtained. Dose reduction techniques were used.  CONTRAST: 85 mL of Isovue-370    FINDINGS: Respiratory motion.    LOWER CHEST: There is a small hiatal hernia.    HEPATOBILIARY: Few scattered liver cysts, focal fatty infiltration adjacent to the gallbladder fossa and underlying hepatic steatosis again noted. Cysts need no follow-up.    PANCREAS: Mild fatty atrophy.    SPLEEN: Normal.    ADRENAL GLANDS: Normal.    KIDNEYS/BLADDER: Right renal cysts are again noted. These need no follow-up.    BOWEL: No inflammatory changes. Sigmoid staple line. Extensive scattered colonic diverticulosis throughout. Bowel is of normal caliber..    LYMPH NODES: Normal.    VASCULATURE: Moderate  atherosclerotic vascular disease. No aneurysm. Vessels are patent.    PELVIC ORGANS: Prostate is enlarged.    MUSCULOSKELETAL: Mild spondylitic changes at L1 L3. There is a tiny umbilical hernia containing only fat. No suspicious lesions.      Impression    IMPRESSION:   1.  No abnormalities are seen to explain symptoms.  2.  Specifically, no inflammatory changes, calculi or bowel obstruction.  3.  Extensive colonic diverticulosis and prior sigmoid resection.  4.  Prostate is enlarged.       Discharge Medications   Discharge Medication List as of 10/23/2023 10:10 AM        START taking these medications    Details   cephALEXin (KEFLEX) 500 MG capsule Take 1 capsule (500 mg) by mouth every 8 hours for 10 days, Disp-30 capsule, R-0, E-Prescribe           CONTINUE these medications which have NOT CHANGED    Details   atorvastatin (LIPITOR) 20 MG tablet Take 20 mg by mouth daily, Historical      glipiZIDE (GLUCOTROL) 5 MG tablet Take 5 mg by mouth 2 times daily (before meals), Historical      ibuprofen (ADVIL,MOTRIN) 200 MG tablet [IBUPROFEN (ADVIL,MOTRIN) 200 MG TABLET] Take 400 mg by mouth every 6 (six) hours as needed for pain.       , Historical      metFORMIN (GLUCOPHAGE) 1000 MG tablet [METFORMIN (GLUCOPHAGE) 1000 MG TABLET] Take 1 tablet (1,000 mg total) by mouth 2 (two) times a day with meals., Disp-60 tablet, R-0, Normal      omeprazole (PRILOSEC) 40 MG capsule [OMEPRAZOLE (PRILOSEC) 40 MG CAPSULE] Take 40 mg by mouth daily., Historical      blood glucose test strips [BLOOD GLUCOSE TEST STRIPS] Use 1 each As Directed as needed. Dispense brand per patient's insurance at pharmacy discretion.  ICD Code: E 11.9, Disp-10 strip, R-0, Normal           STOP taking these medications       tiZANidine (ZANAFLEX) 4 MG tablet Comments:   Reason for Stopping:             Allergies   No Known Allergies

## 2023-10-24 ENCOUNTER — PATIENT OUTREACH (OUTPATIENT)
Dept: CARE COORDINATION | Facility: CLINIC | Age: 83
End: 2023-10-24
Payer: COMMERCIAL

## 2023-10-24 NOTE — PROGRESS NOTES
"Clinic Care Coordination Contact  [facility]: Post-Discharge Note  SITUATION                                                      Admission:    Admission Date: 10/19/23   Reason for Admission: Urinary tract infection without hematuria, site unspecified  Discharge:   Discharge Date: 10/23/23  Discharge Diagnosis: Urinary tract infection without hematuria, site unspecified    BACKGROUND                                                      Per hospital discharge summary and inpatient provider notes:  83 year old male who presents with 3 episodes of vomiting, foul smelling urine, general fatigue for the past 24 hours. Straining in order to urinate, endorses pain at end of urinary stream. Feels like he is emptying bladder completely. Needing to get up once during night to urinate. Otherwise denies hematemesis, flank pain, abdominal pain, chills shortness of breath, chest pain or any other complaints. Does notice pain with urination -- ?bladder spasm.      ASSESSMENT           Discharge Assessment  How are you doing now that you are home?: \" Doing alot better\"  How are your symptoms? (Red Flag symptoms escalate to triage hotline per guidelines): Improved  Do you feel your condition is stable enough to be safe at home until your provider visit?: Yes  Does the patient have their discharge instructions? : Yes  Does the patient have questions regarding their discharge instructions? : No  Were you started on any new medications or were there changes to any of your previous medications? : Yes  Does the patient have all of their medications?: Yes  Do you have questions regarding any of your medications? : No  Do you have all of your needed medical supplies or equipment (DME)?  (i.e. oxygen tank, CPAP, cane, etc.): Yes  Discharge follow-up appointment scheduled within 14 calendar days? : No  Is patient agreeable to assistance with scheduling? : No (\" Will call today when we hang up\")    Post-op ([facility] Only)  If the " patient had a surgery or procedure, do they have any questions for a nurse?: No             PLAN                                                      Outpatient Plan:      Follow up with primary care provider, Aramis Burris, within 7 days for hospital follow- up.  The following labs/tests are recommended: blood sugar check.          Activity     Your activity upon discharge: activity as tolerated          Diet     Follow this diet upon discharge: Orders Placed This Encounter      Combination Diet Moderate Consistent Carb (60 g CHO per Meal) Diet       No future appointments.      For any urgent concerns, please contact our 24 hour nurse triage line: [phone] ([phone])         Rita Sánchez MA